# Patient Record
Sex: FEMALE | Race: WHITE | Employment: FULL TIME | URBAN - METROPOLITAN AREA
[De-identification: names, ages, dates, MRNs, and addresses within clinical notes are randomized per-mention and may not be internally consistent; named-entity substitution may affect disease eponyms.]

---

## 2017-05-08 PROBLEM — F43.10 NEUROSIS, POSTTRAUMATIC: Status: ACTIVE | Noted: 2017-02-21

## 2017-05-08 PROBLEM — F17.200 TOBACCO USE DISORDER: Status: ACTIVE | Noted: 2017-05-08

## 2017-12-24 ENCOUNTER — HOSPITAL ENCOUNTER (EMERGENCY)
Age: 27
Discharge: HOME | End: 2017-12-24
Attending: EMERGENCY MEDICINE
Payer: COMMERCIAL

## 2017-12-24 VITALS
SYSTOLIC BLOOD PRESSURE: 126 MMHG | OXYGEN SATURATION: 100 % | HEART RATE: 83 BPM | WEIGHT: 170 LBS | HEIGHT: 60 IN | DIASTOLIC BLOOD PRESSURE: 67 MMHG | RESPIRATION RATE: 16 BRPM | BODY MASS INDEX: 33.38 KG/M2 | TEMPERATURE: 98.3 F

## 2017-12-24 DIAGNOSIS — K08.89 PAIN, DENTAL: Primary | ICD-10-CM

## 2017-12-24 PROCEDURE — 99282 EMERGENCY DEPT VISIT SF MDM: CPT

## 2017-12-24 RX ORDER — TRAMADOL HYDROCHLORIDE 50 MG/1
50 TABLET ORAL EVERY 6 HOURS PRN
Qty: 10 TABLET | Refills: 0 | Status: SHIPPED | OUTPATIENT
Start: 2017-12-24 | End: 2020-01-06 | Stop reason: ALTCHOICE

## 2017-12-24 RX ORDER — PENICILLIN V POTASSIUM 500 MG/1
500 TABLET ORAL 4 TIMES DAILY
Qty: 40 TABLET | Refills: 0 | Status: SHIPPED | OUTPATIENT
Start: 2017-12-24 | End: 2018-01-03

## 2017-12-24 ASSESSMENT — PAIN DESCRIPTION - PAIN TYPE: TYPE: ACUTE PAIN

## 2017-12-24 ASSESSMENT — PAIN DESCRIPTION - LOCATION: LOCATION: TEETH

## 2017-12-24 ASSESSMENT — PAIN SCALES - GENERAL: PAINLEVEL_OUTOF10: 8

## 2017-12-24 NOTE — ED PROVIDER NOTES
[address]  eMERGENCY dEPARTMENT eNCOUnter      Pt Name: Merari Broussard  MRN: [id number]  [place] 1990  Date of evaluation: 12/24/17      CHIEF COMPLAINT:   Chief Complaint   Patient presents with    Dental Pain     HISTORY OF PRESENT ILLNESS    Merari Broussard is a 33 y.o. female who presents with complaints of dental pain that started several days ago. Pt admits to having a broken tooth. Denies n/v/f/c/abd pain/CP/SOB. No trouble breathing or swallowing. Pt has been taking tylenol and motrin with no relief. No other complaints. REVIEW OF SYSTEMS     Constitutional: Denies recent fever, chills. Eyes: No visual changes. Neck: No neck pain. Respiratory: Denies recent shortness of breath. Cardiac:  Denies recent chest pain. GI: denies any recent abdominal pain nausea or vomiting. Denies Blood in the stool or black tarry stools. : denies dysuria. Musculoskeletal: Denies focal weakness. Neurologic: denies headache or focal weakness. Skin:  Denies any rash. Negative in 10 essential Systems except as mentioned above and in the HPI. PAST MEDICAL HISTORY   PMH:  has a past medical history of Bipolar disorder ([facility] [address] and Depression. none otherwise stated in nurses notes  Surgical History:  has no past surgical history on file. none otherwise stated in nurses notes  Social History:  reports that she has been smoking. She does not have any smokeless tobacco history on file. She reports that she does not drink alcohol or use drugs. none otherwise stated in nurses notes  Family History: none otherwise stated in nurses notes  Psychiatric History: none otherwise stated in nurses notes    Allergies:has No Known Allergies.       PHYSICAL EXAM     INITIAL VITALS: /67   Pulse 83   Temp 98.3 °F (36.8 °C) (Oral)   Resp 16   Ht 5' (1.524 m)   Wt 170 lb (77.1 kg)   LMP 11/20/2017   SpO2 100%   BMI 33.20 kg/m²   CONSTITUTIONAL: Vital signs reviewed, Alert and oriented X 3. HEAD: Atraumatic, Normocephalic. EYES: Eyes are normal to inspection, Pupils equal, round and reactive to light. NECK: Normal ROM, No jugular venous distention, No meningeal signs, Cervical spine nontender. MOUTH:  + dental pain at 2, with no signs of abscess formation or Roger sign noted. No swelling involving the airway at all. No tongue elevation. No trismus. Lips are normal.    RESPIRATORY CHEST: No respiratory distress. ABDOMEN: Abdomen is nontender, No distension. UPPER EXTREMITY: Inspection normal, No cyanosis. NEURO: GCS is 15, Speech normal, Memory normal.   SKIN: Skin is warm, Skin is dry. PSYCHIATRIC: Oriented X 3, Normal affect. EMERGENCY DEPARTMENT COURSE:   Pain meds and antibiotic prescriptions. Pt provided with dental clinic list and instructed to call as soon as possible for an appointment. Instructed to return for worsening or any new symptoms including throat swelling, difficulty swallowing or breathing. Pt agrees. FINAL IMPRESSION:     1.  Pain, dental          DISPOSITION:  DISPOSITION Decision To Discharge 12/24/2017 02:51:15 PM        PATIENT REFERRED TO:  Lisa Pardo, [address] [place] [address] [phone]    Call in 1 day      [facility] ED  [facility]  [phone]    If symptoms worsen    dentist  see dental list          DISCHARGE MEDICATIONS:  Discharge Medication List as of 12/24/2017  2:52 PM      START taking these medications    Details   penicillin v potassium (VEETID) 500 MG tablet Take 1 tablet by mouth 4 times daily for 10 days, Disp-40 tablet, R-0Print      traMADol (ULTRAM) 50 MG tablet Take 1 tablet by mouth every 6 hours as needed for Pain ., Disp-10 tablet, R-0Print             (Please note that portions of this note were completed with a voice recognition program.  Efforts were made to edit the dictations but occasionally words are

## 2020-01-06 ENCOUNTER — HOSPITAL ENCOUNTER (EMERGENCY)
Age: 30
Discharge: HOME | End: 2020-01-06
Attending: EMERGENCY MEDICINE
Payer: MEDICARE

## 2020-01-06 VITALS
HEIGHT: 60 IN | OXYGEN SATURATION: 100 % | RESPIRATION RATE: 20 BRPM | WEIGHT: 140 LBS | DIASTOLIC BLOOD PRESSURE: 81 MMHG | SYSTOLIC BLOOD PRESSURE: 150 MMHG | TEMPERATURE: 98.1 F | BODY MASS INDEX: 27.48 KG/M2 | HEART RATE: 77 BPM

## 2020-01-06 LAB
-: ABNORMAL
ABSOLUTE EOS #: 0.51 K/UL (ref 0–0.44)
ABSOLUTE IMMATURE GRANULOCYTE: 0.05 K/UL (ref 0–0.3)
ABSOLUTE LYMPH #: 4.15 K/UL (ref 1.1–3.7)
ABSOLUTE MONO #: 0.9 K/UL (ref 0.1–1.2)
ALBUMIN SERPL-MCNC: 4 G/DL (ref 3.5–5.2)
ALBUMIN/GLOBULIN RATIO: 1.4 (ref 1–2.5)
ALP BLD-CCNC: 80 U/L (ref 35–104)
ALT SERPL-CCNC: 10 U/L (ref 5–33)
AMORPHOUS: ABNORMAL
ANION GAP SERPL CALCULATED.3IONS-SCNC: 12 MMOL/L (ref 9–17)
AST SERPL-CCNC: 12 U/L
BACTERIA: ABNORMAL
BASOPHILS # BLD: 1 % (ref 0–2)
BASOPHILS ABSOLUTE: 0.07 K/UL (ref 0–0.2)
BILIRUB SERPL-MCNC: <0.1 MG/DL (ref 0.3–1.2)
BILIRUBIN URINE: NEGATIVE
BUN BLDV-MCNC: 9 MG/DL (ref 6–20)
BUN/CREAT BLD: ABNORMAL (ref 9–20)
CALCIUM SERPL-MCNC: 9 MG/DL (ref 8.6–10.4)
CASTS UA: ABNORMAL /LPF (ref 0–8)
CHLORIDE BLD-SCNC: 104 MMOL/L (ref 98–107)
CO2: 20 MMOL/L (ref 20–31)
COLOR: YELLOW
COMMENT UA: ABNORMAL
CREAT SERPL-MCNC: 0.61 MG/DL (ref 0.5–0.9)
CRYSTALS, UA: ABNORMAL /HPF
DIFFERENTIAL TYPE: ABNORMAL
EOSINOPHILS RELATIVE PERCENT: 4 % (ref 1–4)
EPITHELIAL CELLS UA: ABNORMAL /HPF (ref 0–5)
GFR AFRICAN AMERICAN: >60 ML/MIN
GFR NON-AFRICAN AMERICAN: >60 ML/MIN
GFR SERPL CREATININE-BSD FRML MDRD: ABNORMAL ML/MIN/{1.73_M2}
GFR SERPL CREATININE-BSD FRML MDRD: ABNORMAL ML/MIN/{1.73_M2}
GLUCOSE BLD-MCNC: 100 MG/DL (ref 70–99)
GLUCOSE URINE: NEGATIVE
HCG QUALITATIVE: NEGATIVE
HCT VFR BLD CALC: 43.4 % (ref 36.3–47.1)
HEMOGLOBIN: 14.2 G/DL (ref 11.9–15.1)
IMMATURE GRANULOCYTES: 0 %
KETONES, URINE: ABNORMAL
LEUKOCYTE ESTERASE, URINE: ABNORMAL
LYMPHOCYTES # BLD: 30 % (ref 24–43)
MCH RBC QN AUTO: 29.3 PG (ref 25.2–33.5)
MCHC RBC AUTO-ENTMCNC: 32.7 G/DL (ref 28.4–34.8)
MCV RBC AUTO: 89.7 FL (ref 82.6–102.9)
MONOCYTES # BLD: 7 % (ref 3–12)
MUCUS: ABNORMAL
NITRITE, URINE: NEGATIVE
NRBC AUTOMATED: 0 PER 100 WBC
OTHER OBSERVATIONS UA: ABNORMAL
PDW BLD-RTO: 13.1 % (ref 11.8–14.4)
PH UA: 5 (ref 5–8)
PLATELET # BLD: 403 K/UL (ref 138–453)
PLATELET ESTIMATE: ABNORMAL
PMV BLD AUTO: 10.2 FL (ref 8.1–13.5)
POTASSIUM SERPL-SCNC: 3.6 MMOL/L (ref 3.7–5.3)
PROTEIN UA: NEGATIVE
RBC # BLD: 4.84 M/UL (ref 3.95–5.11)
RBC # BLD: ABNORMAL 10*6/UL
RBC UA: ABNORMAL /HPF (ref 0–4)
RENAL EPITHELIAL, UA: ABNORMAL /HPF
SEG NEUTROPHILS: 58 % (ref 36–65)
SEGMENTED NEUTROPHILS ABSOLUTE COUNT: 8.15 K/UL (ref 1.5–8.1)
SODIUM BLD-SCNC: 136 MMOL/L (ref 135–144)
SPECIFIC GRAVITY UA: 1.02 (ref 1–1.03)
TOTAL PROTEIN: 6.8 G/DL (ref 6.4–8.3)
TRICHOMONAS: ABNORMAL
TURBIDITY: ABNORMAL
URINE HGB: ABNORMAL
UROBILINOGEN, URINE: NORMAL
WBC # BLD: 13.8 K/UL (ref 3.5–11.3)
WBC # BLD: ABNORMAL 10*3/UL
WBC UA: ABNORMAL /HPF (ref 0–5)
YEAST: ABNORMAL

## 2020-01-06 PROCEDURE — 80053 COMPREHEN METABOLIC PANEL: CPT

## 2020-01-06 PROCEDURE — 96375 TX/PRO/DX INJ NEW DRUG ADDON: CPT

## 2020-01-06 PROCEDURE — 81001 URINALYSIS AUTO W/SCOPE: CPT

## 2020-01-06 PROCEDURE — 84703 CHORIONIC GONADOTROPIN ASSAY: CPT

## 2020-01-06 PROCEDURE — 2580000003 HC RX 258: Performed by: NURSE PRACTITIONER

## 2020-01-06 PROCEDURE — 87086 URINE CULTURE/COLONY COUNT: CPT

## 2020-01-06 PROCEDURE — 99284 EMERGENCY DEPT VISIT MOD MDM: CPT

## 2020-01-06 PROCEDURE — 85025 COMPLETE CBC W/AUTO DIFF WBC: CPT

## 2020-01-06 PROCEDURE — 6360000002 HC RX W HCPCS: Performed by: NURSE PRACTITIONER

## 2020-01-06 PROCEDURE — 96374 THER/PROPH/DIAG INJ IV PUSH: CPT

## 2020-01-06 RX ORDER — 0.9 % SODIUM CHLORIDE 0.9 %
1000 INTRAVENOUS SOLUTION INTRAVENOUS ONCE
Status: COMPLETED | OUTPATIENT
Start: 2020-01-06 | End: 2020-01-06

## 2020-01-06 RX ORDER — OXYCODONE HYDROCHLORIDE AND ACETAMINOPHEN 5; 325 MG/1; MG/1
1 TABLET ORAL EVERY 6 HOURS PRN
Qty: 5 TABLET | Refills: 0 | Status: SHIPPED | OUTPATIENT
Start: 2020-01-06 | End: 2020-01-09

## 2020-01-06 RX ORDER — IBUPROFEN 800 MG/1
800 TABLET ORAL EVERY 8 HOURS PRN
Qty: 30 TABLET | Refills: 0 | Status: SHIPPED | OUTPATIENT
Start: 2020-01-06 | End: 2022-03-18

## 2020-01-06 RX ORDER — FENTANYL CITRATE 50 UG/ML
50 INJECTION, SOLUTION INTRAMUSCULAR; INTRAVENOUS ONCE
Status: COMPLETED | OUTPATIENT
Start: 2020-01-06 | End: 2020-01-06

## 2020-01-06 RX ORDER — KETOROLAC TROMETHAMINE 15 MG/ML
15 INJECTION, SOLUTION INTRAMUSCULAR; INTRAVENOUS ONCE
Status: COMPLETED | OUTPATIENT
Start: 2020-01-06 | End: 2020-01-06

## 2020-01-06 RX ORDER — ONDANSETRON 2 MG/ML
4 INJECTION INTRAMUSCULAR; INTRAVENOUS ONCE
Status: COMPLETED | OUTPATIENT
Start: 2020-01-06 | End: 2020-01-06

## 2020-01-06 RX ORDER — TAMSULOSIN HYDROCHLORIDE 0.4 MG/1
0.4 CAPSULE ORAL DAILY
Qty: 20 CAPSULE | Refills: 0 | Status: SHIPPED | OUTPATIENT
Start: 2020-01-06 | End: 2020-01-26

## 2020-01-06 RX ADMIN — SODIUM CHLORIDE 1000 ML: 9 INJECTION, SOLUTION INTRAVENOUS at 17:54

## 2020-01-06 RX ADMIN — ONDANSETRON 4 MG: 2 INJECTION INTRAMUSCULAR; INTRAVENOUS at 17:50

## 2020-01-06 RX ADMIN — KETOROLAC TROMETHAMINE 15 MG: 15 INJECTION, SOLUTION INTRAMUSCULAR; INTRAVENOUS at 17:49

## 2020-01-06 RX ADMIN — FENTANYL CITRATE 50 MCG: 50 INJECTION, SOLUTION INTRAMUSCULAR; INTRAVENOUS at 17:49

## 2020-01-06 ASSESSMENT — ENCOUNTER SYMPTOMS
SHORTNESS OF BREATH: 0
CHEST TIGHTNESS: 0
VOMITING: 1
SORE THROAT: 0
TROUBLE SWALLOWING: 0
WHEEZING: 0
ABDOMINAL PAIN: 1
COUGH: 0
CONSTIPATION: 0
BACK PAIN: 1
DIARRHEA: 0
NAUSEA: 1
FACIAL SWELLING: 0
STRIDOR: 0
BLOOD IN STOOL: 0
EYE PAIN: 0
RHINORRHEA: 0
PHOTOPHOBIA: 0
VOICE CHANGE: 0

## 2020-01-06 ASSESSMENT — PAIN SCALES - GENERAL
PAINLEVEL_OUTOF10: 10
PAINLEVEL_OUTOF10: 10

## 2020-01-06 ASSESSMENT — PAIN DESCRIPTION - DESCRIPTORS: DESCRIPTORS: SHARP

## 2020-01-06 ASSESSMENT — PAIN DESCRIPTION - PAIN TYPE: TYPE: ACUTE PAIN

## 2020-01-06 ASSESSMENT — PAIN DESCRIPTION - PROGRESSION: CLINICAL_PROGRESSION: GRADUALLY WORSENING

## 2020-01-06 ASSESSMENT — PAIN DESCRIPTION - FREQUENCY: FREQUENCY: CONTINUOUS

## 2020-01-06 ASSESSMENT — PAIN DESCRIPTION - ONSET: ONSET: SUDDEN

## 2020-01-06 ASSESSMENT — PAIN DESCRIPTION - ORIENTATION: ORIENTATION: LEFT

## 2020-01-06 ASSESSMENT — PAIN DESCRIPTION - LOCATION: LOCATION: FLANK

## 2020-01-06 NOTE — ED PROVIDER NOTES
[facility]     Emergency Department     Faculty Attestation    I performed a history and physical examination of the patient and discussed management with the resident. I reviewed the residents note and agree with the documented findings including all diagnostic interpretations and plan of care. Any areas of disagreement are noted on the chart. I was personally present for the key portions of any procedures. I have documented in the chart those procedures where I was not present during the key portions. I have reviewed the emergency nurses triage note. I agree with the chief complaint, past medical history, past surgical history, allergies, medications, social and family history as documented unless otherwise noted below. Documentation of the HPI, Physical Exam and Medical Decision Making performed by scribvaleria is based on my personal performance of the HPI, PE and MDM. For Physician Assistant/ Nurse Practitioner cases/documentation I have personally evaluated this patient and have completed at least one if not all key elements of the E/M (history, physical exam, and MDM). Additional findings are as noted. Primary Care Physician: Karrie Wilhelm MD    History: This is a 30 y.o. female who presents to the Emergency Department with complaint of flank pain. Left-sided. History of kidney stones although typically on the right. No hematuria but has had some dysuria and decreased urine output. No fevers. Physical:     height is 5' (1.524 m) and weight is 140 lb (63.5 kg). Her oral temperature is 98.1 °F (36.7 °C). Her blood pressure is 150/81 (abnormal) and her pulse is 77. Her respiration is 20 and oxygen saturation is 100%.     30 y.o. female appears uncomfortable, difficult time finding comfortable position on the bed cardiac exam regular rate and rhythm no murmurs rubs gallops, pulmonary clear bilaterally abdomen is soft nontender nondistended but

## 2020-01-06 NOTE — ED PROVIDER NOTES
[facility] ED  Emergency Department Encounter  Advanced Practice Provider     Pt Name: Jeevan Hernandez  MRN: [id number]  [place] 1990  Date of evaluation: 1/6/20  PCP:  Shawn Woodard MD    [address] [place] [address]       Chief Complaint   Patient presents with    Flank Pain     left flank pain with nausea and dry heaving worsening since yesterday       HISTORY OF PRESENT ILLNESS  (Location/Symptom, Timing/Onset,Context/Setting, Quality, Duration, Modifying Factors, Severity.)      Jeevan Hernandez is a 30 y.o. female who presents with possible kidney stone. Patient states that the left-sided flank pain started yesterday however got severely worse today. She states that she has had kidney stones in the past however they are normally on the right side. She states usually she is put on antibiotics as well as Flomax and they resolve. She is never had to have stents or lithotripsy performed. She has not had anything for pain. She is unsure of any blood in her urine due to being on her menstrual cycle at this time. Patient states it is however difficult for her to urinate and painful. She states she has been urinating very often as well. She reports nausea and vomiting with this also. Patient appears diaphoretic and pale due to her pain. She cannot sit still in the bed. She denies fever or chills. No abdominal pain. PAST MEDICAL /SURGICAL / SOCIAL / FAMILY HISTORY      has a past medical history of Anxiety, Bipolar disorder ([address] Depression, and Kidney stones. She denies any surgical history at this time.     Social History     Socioeconomic History    Marital status: Single     Spouse name: Not on file    Number of children: Not on file    Years of education: Not on file    Highest education level: Not on file   Occupational History    Not on file   Social Needs    Financial resource strain: Not on file    Food insecurity:     Worry: Not on file     Inability: Not on file    Transportation needs:     Medical: Not on file     Non-medical: Not on file   Tobacco Use    Smoking status: Current Every Day Smoker     Packs/day: 1.00     Types: Cigarettes    Smokeless tobacco: Never Used   Substance and Sexual Activity    Alcohol use: No    Drug use: No    Sexual activity: Not on file   Lifestyle    Physical activity:     Days per week: Not on file     Minutes per session: Not on file    Stress: Not on file   Relationships    Social connections:     Talks on phone: Not on file     Gets together: Not on file     Attends Taoist service: Not on file     Active member of club or organization: Not on file     Attends meetings of clubs or organizations: Not on file     Relationship status: Not on file    Intimate partner violence:     Fear of current or ex partner: Not on file     Emotionally abused: Not on file     Physically abused: Not on file     Forced sexual activity: Not on file   Other Topics Concern    Not on file   Social History Narrative    Not on file       History reviewed. No pertinent family history. Allergies:  Patient has no known allergies. Home Medications:  Prior to Admission medications    Medication Sig Start Date End Date Taking? Authorizing Provider   tamsulosin (FLOMAX) 0.4 MG capsule Take 1 capsule by mouth daily for 20 days 1/6/20 1/26/20 Yes KASH Choi CNP   ibuprofen (IBU) 800 MG tablet Take 1 tablet by mouth every 8 hours as needed for Pain 1/6/20  Yes KASH Choi CNP   oxyCODONE-acetaminophen (PERCOCET) 5-325 MG per tablet Take 1 tablet by mouth every 6 hours as needed for Pain for up to 3 days. Intended supply: 3 days.  Take lowest dose possible to manage pain 1/6/20 1/9/20 Yes KASH Choi CNP   clomiPHENE (CLOMID) 50 MG tablet TAKE 1 TABLET BY MOUTH DAILY ON DAYS 3 THROUGH 7 OF MENSTRUAL CYCLE 4/21/17   Historical Provider, MD       patient's medication list has been reviewed as entered by the nursing staff. REVIEW OF SYSTEMS    (2-9 systems for level 4, 10 or more for level 5)      Review of Systems   Constitutional: Positive for appetite change and diaphoresis. Negative for chills, fatigue and fever. HENT: Negative for congestion, drooling, ear pain, facial swelling, rhinorrhea, sore throat, tinnitus, trouble swallowing and voice change. Eyes: Negative for photophobia, pain and visual disturbance. Respiratory: Negative for cough, chest tightness, shortness of breath, wheezing and stridor. Cardiovascular: Negative for chest pain, palpitations and leg swelling. Gastrointestinal: Positive for abdominal pain (left sided radiatng from flank), nausea and vomiting. Negative for blood in stool, constipation and diarrhea. Endocrine: Positive for polyuria. Genitourinary: Positive for difficulty urinating, dysuria, flank pain (left flank), frequency, hematuria and urgency. Negative for vaginal discharge and vaginal pain. Musculoskeletal: Positive for back pain. Negative for arthralgias, joint swelling, myalgias and neck pain. Skin: Positive for pallor. Negative for rash and wound. Neurological: Negative for dizziness, seizures, weakness, light-headedness, numbness and headaches. Psychiatric/Behavioral: The patient is nervous/anxious. PHYSICAL EXAM  (up to 7 for level 4, 8 or more for level 5)      INITIAL VITALS:  height is 5' (1.524 m) and weight is 140 lb (63.5 kg). Her oral temperature is 98.1 °F (36.7 °C). Her blood pressure is 150/81 (abnormal) and her pulse is 77. Her respiration is 20 and oxygen saturation is 100%. Physical Exam  Vitals signs and nursing note reviewed. Constitutional:       General: She is awake. She is in acute distress. Appearance: Normal appearance. She is well-developed and overweight. She is diaphoretic. She is not ill-appearing or toxic-appearing. HENT:      Head: Normocephalic and atraumatic.       Right Ear: Hearing and external ear ORDERED:  Orders Placed This Encounter   Medications    0.9 % sodium chloride bolus    ondansetron (ZOFRAN) injection 4 mg    fentaNYL (SUBLIMAZE) injection 50 mcg    ketorolac (TORADOL) injection 15 mg    tamsulosin (FLOMAX) 0.4 MG capsule     Sig: Take 1 capsule by mouth daily for 20 days     Dispense:  20 capsule     Refill:  0    ibuprofen (IBU) 800 MG tablet     Sig: Take 1 tablet by mouth every 8 hours as needed for Pain     Dispense:  30 tablet     Refill:  0    oxyCODONE-acetaminophen (PERCOCET) 5-325 MG per tablet     Sig: Take 1 tablet by mouth every 6 hours as needed for Pain for up to 3 days. Intended supply: 3 days. Take lowest dose possible to manage pain     Dispense:  5 tablet     Refill:  0       Controlled Substances Monitoring:       Differential Diagnoses:  Herniated disc, AAA rupture, pyelonephritis, kidney stone, musculoskeletal pain, retroperitoneal hematoma, pancreatitis, splenic rupture, pneumonia    DIAGNOSTIC RESULTS / EMERGENCY DEPARTMENT COURSE / MDM     Patient arrives today with likely kidney stone. She is having left-sided flank pain as well as burning with urination and difficulty urinating and frequency. She states she has had these in the past however they are normally on the right side. Patient appears to be in extreme pain and cannot sit still in the bed. She is pale and diaphoretic but nontoxic. Patient given pain relief and labs and urine were collected. Patient had immediate relief after this and looks much more comfortable. Patient able to sit comfortably in the bed and walk to the bathroom without issue. Lab results indicate likely kidney stone that is not infected at this time. She was given prescription for Flomax and 5 Percocets and told to take as needed. She was given the phone number for urology to follow-up with soon.   Please return to the ER with any worsening of symptoms including increasing abdominal pain increasing flank pain, fevers chills

## 2020-01-06 NOTE — ED TRIAGE NOTES
Patient presents complaining of pain beginning as dysuria this morning gradually worsening into left flank pain with associated nausea and dry heaving. No true vomiting. Patient has hx kidney stones in the past and states current symptoms are similar.

## 2020-01-06 NOTE — LETTER
[facility] ED [place] [address]  Phone: [phone]               January 6, 2020    Patient: Dannis Halsted   YOB: 1990   Date of Visit: 1/6/2020       To Whom It May Concern:    Roman Irving was seen and treated in our emergency department on 1/6/2020. She may return to work on 1/8/2020.       Sincerely,               Signature:__________________________________

## 2020-01-08 LAB
CULTURE: NORMAL
Lab: NORMAL
SPECIMEN DESCRIPTION: NORMAL

## 2020-02-04 ENCOUNTER — OFFICE VISIT (OUTPATIENT)
Dept: PRIMARY CARE CLINIC | Age: 30
End: 2020-02-04
Payer: MEDICARE

## 2020-02-04 VITALS
WEIGHT: 177 LBS | BODY MASS INDEX: 34.57 KG/M2 | DIASTOLIC BLOOD PRESSURE: 79 MMHG | TEMPERATURE: 97.3 F | HEART RATE: 77 BPM | OXYGEN SATURATION: 97 % | SYSTOLIC BLOOD PRESSURE: 123 MMHG

## 2020-02-04 PROCEDURE — G8484 FLU IMMUNIZE NO ADMIN: HCPCS | Performed by: INTERNAL MEDICINE

## 2020-02-04 PROCEDURE — G8427 DOCREV CUR MEDS BY ELIG CLIN: HCPCS | Performed by: INTERNAL MEDICINE

## 2020-02-04 PROCEDURE — 99202 OFFICE O/P NEW SF 15 MIN: CPT | Performed by: INTERNAL MEDICINE

## 2020-02-04 PROCEDURE — 4004F PT TOBACCO SCREEN RCVD TLK: CPT | Performed by: INTERNAL MEDICINE

## 2020-02-04 PROCEDURE — G8417 CALC BMI ABV UP PARAM F/U: HCPCS | Performed by: INTERNAL MEDICINE

## 2020-02-04 RX ORDER — AMOXICILLIN 500 MG/1
500 CAPSULE ORAL 3 TIMES DAILY
Qty: 21 CAPSULE | Refills: 0 | Status: SHIPPED | OUTPATIENT
Start: 2020-02-04 | End: 2020-02-11

## 2020-02-04 RX ORDER — BUTALBITAL, ACETAMINOPHEN AND CAFFEINE 50; 325; 40 MG/1; MG/1; MG/1
1 TABLET ORAL EVERY 4 HOURS PRN
Qty: 20 TABLET | Refills: 0 | Status: SHIPPED | OUTPATIENT
Start: 2020-02-04 | End: 2022-03-18

## 2020-02-04 ASSESSMENT — ENCOUNTER SYMPTOMS: PHOTOPHOBIA: 1

## 2020-02-04 NOTE — PROGRESS NOTES
[address] [facility]  [address] [place] [address]  Dept: [phone]  Dept Fax: [phone]    Alyse Wilson is a 30 y.o. female who presents to the urgent care today for her medicalconditions/complaints as noted below. Alyse Wilson is c/o of Headache      HPI:     Headache    This is a recurrent problem. The current episode started yesterday. The problem occurs constantly. The pain is located in the bilateral region. The pain does not radiate. The quality of the pain is described as squeezing and sharp. The pain is moderate. Associated symptoms include photophobia. Associated symptoms comments: Dental pain. . She has tried NSAIDs and acetaminophen for the symptoms. The treatment provided no relief. Past Medical History:   Diagnosis Date    Anxiety     Bipolar disorder ([facility] [address]     Depression     Kidney stones         Current Outpatient Medications   Medication Sig Dispense Refill    butalbital-acetaminophen-caffeine (FIORICET, ESGIC) -40 MG per tablet Take 1 tablet by mouth every 4 hours as needed for Headaches 20 tablet 0    amoxicillin (AMOXIL) 500 MG capsule Take 1 capsule by mouth 3 times daily for 7 days 21 capsule 0    ibuprofen (IBU) 800 MG tablet Take 1 tablet by mouth every 8 hours as needed for Pain 30 tablet 0    tamsulosin (FLOMAX) 0.4 MG capsule Take 1 capsule by mouth daily for 20 days 20 capsule 0    clomiPHENE (CLOMID) 50 MG tablet TAKE 1 TABLET BY MOUTH DAILY ON DAYS 3 THROUGH 7 OF MENSTRUAL CYCLE  3     No current facility-administered medications for this visit.       No Known Allergies    Health Maintenance   Topic Date Due    Varicella vaccine (1 of 2 - 2-dose childhood series) 11/22/1992    Pneumococcal 0-64 years Vaccine (1 of 1 - PPSV23) 11/22/1997    DTaP/Tdap/Td vaccine (1 - Tdap) 11/22/2002    Flu vaccine (1) 09/01/2019    Cervical cancer screen  02/01/2020    Shingles Vaccine (1 of 2)

## 2020-02-04 NOTE — PROGRESS NOTES
Visit Information    Have you changed or started any medications since your last visit including any over-the-counter medicines, vitamins, or herbal medicines? no   Are you having any side effects from any of your medications? -  no  Have you stopped taking any of your medications? Is so, why? -  no    Have you seen any other physician or provider since your last visit? No  Have you had any other diagnostic tests since your last visit? No  Have you been seen in the emergency room and/or had an admission to a hospital since we last saw you? No  Have you had your routine dental cleaning in the past 6 months? no    Have you activated your [employer] account? If not, what are your barriers?  Yes     Patient Care Team:  Lanie Osborn MD as PCP - General (Family Medicine)  Lanie Osborn MD as PCP - [facility]    Medical History Review  Past Medical, Family, and Social History reviewed and does not contribute to the patient presenting condition    Health Maintenance   Topic Date Due    Varicella Vaccine (1 of 2 - 2-dose childhood series) 11/22/1992    Pneumococcal 0-64 years Vaccine (1 of 1 - PPSV23) 11/22/1997    DTaP/Tdap/Td vaccine (1 - Tdap) 11/22/2002    Flu vaccine (1) 09/01/2019    Cervical cancer screen  02/01/2020    HIV screen  Discontinued

## 2020-06-18 ENCOUNTER — HOSPITAL ENCOUNTER (EMERGENCY)
Age: 30
Discharge: HOME | End: 2020-06-18
Attending: EMERGENCY MEDICINE
Payer: MEDICARE

## 2020-06-18 VITALS
DIASTOLIC BLOOD PRESSURE: 85 MMHG | TEMPERATURE: 98.2 F | SYSTOLIC BLOOD PRESSURE: 139 MMHG | HEART RATE: 82 BPM | OXYGEN SATURATION: 98 % | RESPIRATION RATE: 18 BRPM

## 2020-06-18 PROCEDURE — 96372 THER/PROPH/DIAG INJ SC/IM: CPT

## 2020-06-18 PROCEDURE — 99282 EMERGENCY DEPT VISIT SF MDM: CPT

## 2020-06-18 PROCEDURE — 6370000000 HC RX 637 (ALT 250 FOR IP): Performed by: STUDENT IN AN ORGANIZED HEALTH CARE EDUCATION/TRAINING PROGRAM

## 2020-06-18 PROCEDURE — 6360000002 HC RX W HCPCS: Performed by: STUDENT IN AN ORGANIZED HEALTH CARE EDUCATION/TRAINING PROGRAM

## 2020-06-18 RX ORDER — KETOROLAC TROMETHAMINE 30 MG/ML
30 INJECTION, SOLUTION INTRAMUSCULAR; INTRAVENOUS ONCE
Status: COMPLETED | OUTPATIENT
Start: 2020-06-18 | End: 2020-06-18

## 2020-06-18 RX ORDER — PENICILLIN V POTASSIUM 500 MG/1
500 TABLET ORAL 4 TIMES DAILY
Qty: 28 TABLET | Refills: 0 | Status: SHIPPED | OUTPATIENT
Start: 2020-06-18 | End: 2020-06-25

## 2020-06-18 RX ORDER — PENICILLIN V POTASSIUM 250 MG/1
500 TABLET ORAL ONCE
Status: COMPLETED | OUTPATIENT
Start: 2020-06-18 | End: 2020-06-18

## 2020-06-18 RX ORDER — ACETAMINOPHEN 325 MG/1
650 TABLET ORAL EVERY 6 HOURS PRN
Qty: 120 TABLET | Refills: 0 | Status: SHIPPED | OUTPATIENT
Start: 2020-06-18 | End: 2022-03-18

## 2020-06-18 RX ORDER — IBUPROFEN 800 MG/1
800 TABLET ORAL 2 TIMES DAILY PRN
Qty: 60 TABLET | Refills: 0 | Status: SHIPPED | OUTPATIENT
Start: 2020-06-18 | End: 2022-03-18

## 2020-06-18 RX ADMIN — KETOROLAC TROMETHAMINE 30 MG: 30 INJECTION, SOLUTION INTRAMUSCULAR at 09:11

## 2020-06-18 RX ADMIN — PENICILLIN V POTASSIUM 500 MG: 250 TABLET ORAL at 09:11

## 2020-06-18 ASSESSMENT — ENCOUNTER SYMPTOMS
VOMITING: 0
SHORTNESS OF BREATH: 0
NAUSEA: 0
FACIAL SWELLING: 1
COUGH: 0

## 2020-06-18 ASSESSMENT — PAIN DESCRIPTION - LOCATION: LOCATION: JAW

## 2020-06-18 ASSESSMENT — PAIN SCALES - GENERAL
PAINLEVEL_OUTOF10: 10
PAINLEVEL_OUTOF10: 5

## 2020-06-18 ASSESSMENT — PAIN DESCRIPTION - DESCRIPTORS
DESCRIPTORS: SORE
DESCRIPTORS: TENDER;THROBBING

## 2020-06-18 ASSESSMENT — PAIN DESCRIPTION - PAIN TYPE: TYPE: ACUTE PAIN

## 2020-06-18 ASSESSMENT — PAIN DESCRIPTION - FREQUENCY: FREQUENCY: INTERMITTENT

## 2020-06-18 NOTE — ED PROVIDER NOTES
[address] [facility]     Emergency Department     Faculty Attestation        I performed a history and physical examination of the patient and discussed management with the resident. I reviewed the residents note and agree with the documented findings and plan of care. Any areas of disagreement are noted on the chart. I was personally present for the key portions of any procedures. I have documented in the chart those procedures where I was not present during the key portions. I have reviewed the emergency nurses triage note. I agree with the chief complaint, past medical history, past surgical history, allergies, medications, social and family history as documented unless otherwise noted below. For mid-level providers such as nurse practitioners as well as physicians assistants:    I have personally seen and evaluated the patient. I find the patient's history and physical exam are consistent with NP/PA documentation. I agree with the care provided, treatment rendered, disposition, & follow-up plan. Additional findings are as noted.     Vital Signs: /85   Pulse 82   Temp 98.2 °F (36.8 °C) (Oral)   Resp 18   SpO2 98%   PCP:  Carolina Torres MD    Pertinent Comments:           Critical Care  None          Irene Yañez MD  Attending Emergency Medicine Physician              Katy Spencer MD  06/18/20 [id number]

## 2020-06-18 NOTE — ED PROVIDER NOTES
Not on file     Active member of club or organization: Not on file     Attends meetings of clubs or organizations: Not on file     Relationship status: Not on file    Intimate partner violence     Fear of current or ex partner: Not on file     Emotionally abused: Not on file     Physically abused: Not on file     Forced sexual activity: Not on file   Other Topics Concern    Not on file   Social History Narrative    Not on file       History reviewed. No pertinent family history. Allergies:  Patient has no known allergies. Home Medications:  Prior to Admission medications    Medication Sig Start Date End Date Taking? Authorizing Provider   ibuprofen (ADVIL;MOTRIN) 800 MG tablet Take 1 tablet by mouth 2 times daily as needed for Pain 6/18/20  Yes Elsa Obrien DO   acetaminophen (TYLENOL) 325 MG tablet Take 2 tablets by mouth every 6 hours as needed for Pain 6/18/20  Yes Elsa Obrien DO   penicillin v potassium (VEETID) 500 MG tablet Take 1 tablet by mouth 4 times daily for 7 days 6/18/20 6/25/20 Yes Elsa Obrien DO   butalbital-acetaminophen-caffeine (FIORICET, ESGIC) -82 MG per tablet Take 1 tablet by mouth every 4 hours as needed for Headaches 2/4/20   Abby Cali MD   [facility]) 0.4 MG capsule Take 1 capsule by mouth daily for 20 days 1/6/20 1/26/20  KASH Jacuqes CNP   ibuprofen (IBU) 800 MG tablet Take 1 tablet by mouth every 8 hours as needed for Pain 1/6/20   KASH Jacques - CNP   clomiPHENE (CLOMID) 50 MG tablet TAKE 1 TABLET BY MOUTH DAILY ON DAYS 3 THROUGH 7 OF MENSTRUAL CYCLE 4/21/17   Historical Provider, MD       REVIEW OF SYSTEMS    (2-9 systems for level 4, 10 or more for level 5)      Review of Systems   Constitutional: Negative for fever. HENT: Positive for dental problem and facial swelling. Eyes: Negative for visual disturbance. Respiratory: Negative for cough and shortness of breath.     Gastrointestinal: Negative for nausea and obvious distress presenting with dental pain. Pain to palpation of the gingiva of top left molar. Plan for analgesics, antibiotics and dental clinic follow-up      EMERGENCY DEPARTMENT COURSE:  Evaluated bedside, no acute distress, vital signs stable and afebrile. No evidence of fluctuant abscess on exam.  Patient with multiple dental caries. Patient given IM Toradol, p.o. penicillin VK. Plan for discharge with dental clinic follow-up. Patient given strict ED return cautions follow directions. Verbalized understanding of and agreement with discharge plan    PROCEDURES:  None    CONSULTS:  None    CRITICAL CARE:  Please see attending note    FINAL IMPRESSION      1. Pain, dental          DISPOSITION / PLAN     DISPOSITION        PATIENT REFERRED TO:  Maverick Bob, [address]  [address] [place] [address]  [phone]    Schedule an appointment as soon as possible for a visit in 1 week  For re-evaluation, if unable to see dentist      DISCHARGE MEDICATIONS:  Discharge Medication List as of 6/18/2020  9:27 AM      START taking these medications    Details   !! ibuprofen (ADVIL;MOTRIN) 800 MG tablet Take 1 tablet by mouth 2 times daily as needed for Pain, Disp-60 tablet, R-0Print      acetaminophen (TYLENOL) 325 MG tablet Take 2 tablets by mouth every 6 hours as needed for Pain, Disp-120 tablet, R-0Print      penicillin v potassium (VEETID) 500 MG tablet Take 1 tablet by mouth 4 times daily for 7 days, Disp-28 tablet, R-0Print       !! - Potential duplicate medications found. Please discuss with provider.           Quintin Day DO  Emergency Medicine Resident    (Please note that portions of thisnote were completed with a voice recognition program.  Efforts were made to edit the dictations but occasionally words are mis-transcribed.)        Quintin Day DO  Resident  06/18/20 Debora Lamar

## 2020-07-21 ENCOUNTER — HOSPITAL ENCOUNTER (EMERGENCY)
Age: 30
Discharge: HOME | End: 2020-07-21
Attending: EMERGENCY MEDICINE
Payer: MEDICARE

## 2020-07-21 ENCOUNTER — APPOINTMENT (OUTPATIENT)
Dept: GENERAL RADIOLOGY | Age: 30
End: 2020-07-21
Payer: MEDICARE

## 2020-07-21 VITALS
SYSTOLIC BLOOD PRESSURE: 142 MMHG | OXYGEN SATURATION: 97 % | BODY MASS INDEX: 25.61 KG/M2 | TEMPERATURE: 97.9 F | HEART RATE: 64 BPM | RESPIRATION RATE: 15 BRPM | HEIGHT: 64 IN | DIASTOLIC BLOOD PRESSURE: 79 MMHG | WEIGHT: 150 LBS

## 2020-07-21 LAB
ABSOLUTE EOS #: 0.34 K/UL (ref 0–0.44)
ABSOLUTE IMMATURE GRANULOCYTE: 0.04 K/UL (ref 0–0.3)
ABSOLUTE LYMPH #: 2.38 K/UL (ref 1.1–3.7)
ABSOLUTE MONO #: 0.82 K/UL (ref 0.1–1.2)
ANION GAP SERPL CALCULATED.3IONS-SCNC: 11 MMOL/L (ref 9–17)
BASOPHILS # BLD: 1 % (ref 0–2)
BASOPHILS ABSOLUTE: 0.06 K/UL (ref 0–0.2)
BUN BLDV-MCNC: 9 MG/DL (ref 6–20)
BUN/CREAT BLD: NORMAL (ref 9–20)
CALCIUM SERPL-MCNC: 9.2 MG/DL (ref 8.6–10.4)
CHLORIDE BLD-SCNC: 102 MMOL/L (ref 98–107)
CO2: 24 MMOL/L (ref 20–31)
CREAT SERPL-MCNC: 0.59 MG/DL (ref 0.5–0.9)
DIFFERENTIAL TYPE: ABNORMAL
EOSINOPHILS RELATIVE PERCENT: 4 % (ref 1–4)
GFR AFRICAN AMERICAN: >60 ML/MIN
GFR NON-AFRICAN AMERICAN: >60 ML/MIN
GFR SERPL CREATININE-BSD FRML MDRD: NORMAL ML/MIN/{1.73_M2}
GFR SERPL CREATININE-BSD FRML MDRD: NORMAL ML/MIN/{1.73_M2}
GLUCOSE BLD-MCNC: 94 MG/DL (ref 70–99)
HCT VFR BLD CALC: 46.7 % (ref 36.3–47.1)
HEMOGLOBIN: 15.9 G/DL (ref 11.9–15.1)
IMMATURE GRANULOCYTES: 1 %
LYMPHOCYTES # BLD: 29 % (ref 24–43)
MCH RBC QN AUTO: 29.9 PG (ref 25.2–33.5)
MCHC RBC AUTO-ENTMCNC: 34 G/DL (ref 28.4–34.8)
MCV RBC AUTO: 87.8 FL (ref 82.6–102.9)
MONOCYTES # BLD: 10 % (ref 3–12)
NRBC AUTOMATED: 0 PER 100 WBC
PDW BLD-RTO: 13 % (ref 11.8–14.4)
PLATELET # BLD: 379 K/UL (ref 138–453)
PLATELET ESTIMATE: ABNORMAL
PMV BLD AUTO: 9.9 FL (ref 8.1–13.5)
POTASSIUM SERPL-SCNC: 4.3 MMOL/L (ref 3.7–5.3)
RBC # BLD: 5.32 M/UL (ref 3.95–5.11)
RBC # BLD: ABNORMAL 10*6/UL
SEG NEUTROPHILS: 55 % (ref 36–65)
SEGMENTED NEUTROPHILS ABSOLUTE COUNT: 4.68 K/UL (ref 1.5–8.1)
SODIUM BLD-SCNC: 137 MMOL/L (ref 135–144)
WBC # BLD: 8.3 K/UL (ref 3.5–11.3)
WBC # BLD: ABNORMAL 10*3/UL

## 2020-07-21 PROCEDURE — 85025 COMPLETE CBC W/AUTO DIFF WBC: CPT

## 2020-07-21 PROCEDURE — 71045 X-RAY EXAM CHEST 1 VIEW: CPT

## 2020-07-21 PROCEDURE — 6370000000 HC RX 637 (ALT 250 FOR IP): Performed by: STUDENT IN AN ORGANIZED HEALTH CARE EDUCATION/TRAINING PROGRAM

## 2020-07-21 PROCEDURE — 93005 ELECTROCARDIOGRAM TRACING: CPT | Performed by: STUDENT IN AN ORGANIZED HEALTH CARE EDUCATION/TRAINING PROGRAM

## 2020-07-21 PROCEDURE — 80048 BASIC METABOLIC PNL TOTAL CA: CPT

## 2020-07-21 PROCEDURE — 99284 EMERGENCY DEPT VISIT MOD MDM: CPT

## 2020-07-21 RX ORDER — BENZONATATE 100 MG/1
100 CAPSULE ORAL ONCE
Status: COMPLETED | OUTPATIENT
Start: 2020-07-21 | End: 2020-07-21

## 2020-07-21 RX ADMIN — BENZONATATE 100 MG: 100 CAPSULE ORAL at 12:15

## 2020-07-21 ASSESSMENT — PAIN DESCRIPTION - PAIN TYPE: TYPE: ACUTE PAIN

## 2020-07-21 ASSESSMENT — ENCOUNTER SYMPTOMS
COUGH: 1
SORE THROAT: 0
VOMITING: 0
NAUSEA: 1
SHORTNESS OF BREATH: 1
PHOTOPHOBIA: 0
ABDOMINAL PAIN: 0

## 2020-07-21 ASSESSMENT — PAIN DESCRIPTION - DESCRIPTORS: DESCRIPTORS: CONSTANT

## 2020-07-21 ASSESSMENT — PAIN DESCRIPTION - FREQUENCY: FREQUENCY: CONTINUOUS

## 2020-07-21 ASSESSMENT — PAIN DESCRIPTION - LOCATION: LOCATION: HEAD

## 2020-07-21 NOTE — ED PROVIDER NOTES
[facility] ED  Emergency Department Encounter  EmergencyMedicine Resident     Pt Name:Glia Owen  MRN: [id number]  [place] 1990  Date of evaluation: 7/21/20  PCP:  No primary care provider on file. CHIEF COMPLAINT       Chief Complaint   Patient presents with    Headache     \"on and off\"    Cough     x several days with stuffy nose, \"I cough so hard mt chest hurts\"    Nausea     emesis, none since yesterday       HISTORY OF PRESENT ILLNESS  (Location/Symptom, Timing/Onset, Context/Setting, Quality, Duration, Modifying Factors, Severity.)      Cally Delacruz is a 30 y.o. female who presents with Cough and shortness of breath. Patient reports that for the past 5 days patient is been having dry cough, shortness of breath, and chest tightness. Patient also notes that she is having multiple episodes of liquid stools along with nausea and vomiting and has a hard time keeping things down. Patient presents today as she is confirmed that she is infectious and her 1year-old son is now starting to develop a cough. Patient denies any recent cold exposure. Patient notes that she has had no fevers or chills, sore throat, abdominal pain, or dysuria. Patient denies any recent travels, surgeries, trauma, history of DVTs, cancer, or estrogen use. PAST MEDICAL / SURGICAL / SOCIAL / FAMILY HISTORY      has a past medical history of Anxiety, Bipolar disorder ([address] Depression, and Kidney stones. has no past surgical history on file.     Social History     Socioeconomic History    Marital status: Single     Spouse name: Not on file    Number of children: Not on file    Years of education: Not on file    Highest education level: Not on file   Occupational History    Not on file   Social Needs    Financial resource strain: Not on file    Food insecurity     Worry: Not on file     Inability: Not on file    Transportation needs     Medical: Not on file     Non-medical: Not on file   Tobacco Use    Smoking status: Current Every Day Smoker     Packs/day: 1.00     Types: Cigarettes    Smokeless tobacco: Never Used   Substance and Sexual Activity    Alcohol use: No    Drug use: No    Sexual activity: Not on file   Lifestyle    Physical activity     Days per week: Not on file     Minutes per session: Not on file    Stress: Not on file   Relationships    Social connections     Talks on phone: Not on file     Gets together: Not on file     Attends [employer] service: Not on file     Active member of club or organization: Not on file     Attends meetings of clubs or organizations: Not on file     Relationship status: Not on file    Intimate partner violence     Fear of current or ex partner: Not on file     Emotionally abused: Not on file     Physically abused: Not on file     Forced sexual activity: Not on file   Other Topics Concern    Not on file   Social History Narrative    Not on file       History reviewed. No pertinent family history. Allergies:  Patient has no known allergies. Home Medications:  Prior to Admission medications    Medication Sig Start Date End Date Taking?  Authorizing Provider   ibuprofen (ADVIL;MOTRIN) 800 MG tablet Take 1 tablet by mouth 2 times daily as needed for Pain 6/18/20  Yes Keo Oquendo, DO   acetaminophen (TYLENOL) 325 MG tablet Take 2 tablets by mouth every 6 hours as needed for Pain 6/18/20  Yes Keo Oquendo, DO   ibuprofen (IBU) 800 MG tablet Take 1 tablet by mouth every 8 hours as needed for Pain 1/6/20  Yes KASH Bucio CNP   butalbital-acetaminophen-caffeine (FIORICET, ESGIC) -40 MG per tablet Take 1 tablet by mouth every 4 hours as needed for Headaches 2/4/20   Mai Silva MD   tamsulosin [facility]) 0.4 MG capsule Take 1 capsule by mouth daily for 20 days 1/6/20 1/26/20  KASH Freeman CNP   clomiPHENE (CLOMID) 50 MG tablet TAKE 1 TABLET BY MOUTH DAILY ON DAYS 3 THROUGH 7 OF MENSTRUAL CYCLE 4/21/17 Historical Provider, MD       REVIEW OF SYSTEMS    (2-9 systems for level 4, 10 or more for level 5)      Review of Systems   Constitutional: Negative for chills, fatigue and fever. HENT: Negative for congestion and sore throat. Eyes: Negative for photophobia and visual disturbance. Respiratory: Positive for cough and shortness of breath. Cardiovascular: Negative for chest pain and palpitations. Gastrointestinal: Positive for nausea. Negative for abdominal pain and vomiting. Genitourinary: Negative for dysuria and hematuria. Musculoskeletal: Negative for arthralgias and myalgias. Skin: Negative for rash and wound. Neurological: Negative for weakness and numbness. PHYSICAL EXAM   (up to 7 for level 4, 8 or more for level 5)      INITIAL VITALS:   BP (!) 142/79   Pulse 64   Temp 97.9 °F (36.6 °C) (Oral)   Resp 15   Ht 5' 4\" (1.626 m)   Wt 150 lb (68 kg)   LMP 07/21/2020   SpO2 97%   BMI 25.75 kg/m²     Physical Exam  Vitals signs and nursing note reviewed. Constitutional:       General: She is not in acute distress. Appearance: She is well-developed. She is not diaphoretic. HENT:      Head: Normocephalic and atraumatic. Right Ear: External ear normal.      Left Ear: External ear normal.      Nose: Nose normal.      Mouth/Throat:      Mouth: Mucous membranes are moist.   Eyes:      Conjunctiva/sclera: Conjunctivae normal.   Neck:      Musculoskeletal: Normal range of motion and neck supple. No neck rigidity. Vascular: No JVD. Trachea: No tracheal deviation. Cardiovascular:      Rate and Rhythm: Normal rate and regular rhythm. Pulses: Normal pulses. Heart sounds: Normal heart sounds, S1 normal and S2 normal. No murmur. No friction rub. No gallop. Pulmonary:      Effort: Pulmonary effort is normal. No respiratory distress. Breath sounds: Normal breath sounds. Abdominal:      General: Abdomen is flat. There is no distension.       Palpations: Abdomen is soft. Tenderness: There is no abdominal tenderness. There is no guarding or rebound. Musculoskeletal: Normal range of motion. General: No tenderness. Skin:     General: Skin is warm and dry. Capillary Refill: Capillary refill takes less than 2 seconds. Neurological:      Mental Status: She is alert and oriented to person, place, and time. Motor: No abnormal muscle tone.          DIFFERENTIAL  DIAGNOSIS     PLAN (LABS / IMAGING / EKG):  Orders Placed This Encounter   Procedures    XR CHEST PORTABLE    Basic Metabolic Panel    CBC WITH AUTO DIFFERENTIAL    EKG 12 Lead    EKG REPORT       MEDICATIONS ORDERED:  Orders Placed This Encounter   Medications    benzonatate (TESSALON) capsule 100 mg       DDX: bronchitis, viral URI, COVID, and gastroenteritis    DIAGNOSTIC RESULTS / EMERGENCY DEPARTMENT COURSE / MDM     LABS:  Results for orders placed or performed during the hospital encounter of 67/81/43   Basic Metabolic Panel   Result Value Ref Range    Glucose 94 70 - 99 mg/dL    BUN 9 6 - 20 mg/dL    CREATININE 0.59 0.50 - 0.90 mg/dL    Bun/Cre Ratio NOT REPORTED 9 - 20    Calcium 9.2 8.6 - 10.4 mg/dL    Sodium 137 135 - 144 mmol/L    Potassium 4.3 3.7 - 5.3 mmol/L    Chloride 102 98 - 107 mmol/L    CO2 24 20 - 31 mmol/L    Anion Gap 11 9 - 17 mmol/L    GFR Non-African American >60 >60 mL/min    GFR African American >60 >60 mL/min    GFR Comment          GFR Staging NOT REPORTED    CBC WITH AUTO DIFFERENTIAL   Result Value Ref Range    WBC 8.3 3.5 - 11.3 k/uL    RBC 5.32 (H) 3.95 - 5.11 m/uL    Hemoglobin 15.9 (H) 11.9 - 15.1 g/dL    Hematocrit 46.7 36.3 - 47.1 %    MCV 87.8 82.6 - 102.9 fL    MCH 29.9 25.2 - 33.5 pg    MCHC 34.0 28.4 - 34.8 g/dL    RDW 13.0 11.8 - 14.4 %    Platelets 691 160 - 807 k/uL    MPV 9.9 8.1 - 13.5 fL    NRBC Automated 0.0 0.0 per 100 WBC    Differential Type NOT REPORTED     Seg Neutrophils 55 36 - 65 %    Lymphocytes 29 24 - 43 %    Monocytes 10 3 - 12 %    Eosinophils % 4 1 - 4 %    Basophils 1 0 - 2 %    Immature Granulocytes 1 (H) 0 %    Segs Absolute 4.68 1.50 - 8.10 k/uL    Absolute Lymph # 2.38 1.10 - 3.70 k/uL    Absolute Mono # 0.82 0.10 - 1.20 k/uL    Absolute Eos # 0.34 0.00 - 0.44 k/uL    Basophils Absolute 0.06 0.00 - 0.20 k/uL    Absolute Immature Granulocyte 0.04 0.00 - 0.30 k/uL    WBC Morphology NOT REPORTED     RBC Morphology NOT REPORTED     Platelet Estimate NOT REPORTED    EKG 12 Lead   Result Value Ref Range    Ventricular Rate 77 BPM    Atrial Rate 77 BPM    P-R Interval 158 ms    QRS Duration 80 ms    Q-T Interval 398 ms    QTc Calculation (Bazett) 450 ms    P Axis 45 degrees    R Axis 67 degrees    T Axis 59 degrees         RADIOLOGY:  Xr Chest Portable    Result Date: 7/21/2020  EXAMINATION: ONE XRAY VIEW OF THE CHEST 7/21/2020 12:34 pm COMPARISON: 05/17/2016 HISTORY: ORDERING SYSTEM PROVIDED HISTORY: shortness of breath, cough TECHNOLOGIST PROVIDED HISTORY: shortness of breath, cough FINDINGS: Cardiac and mediastinal silhouettes appear within normal limits for size. Pulmonary vascularity is normal.  No parenchymal consolidation or pleural effusion is identified. No pneumothorax is seen. No acute osseous abnormality is identified. Similar appearing chest to 2016, with no focal acute infiltrate, pneumothorax or pulmonary edema identified. EKG  EKG Interpretation    Interpreted by emergency department physician    Rhythm: normal sinus   Rate: normal  Axis: normal  Ectopy: none  Conduction: normal  ST Segments: normal  T Waves: normal  Q Waves: none    Clinical Impression: normal ecg    Quyen Fear      All EKG's are interpreted by the Emergency Department Physician who either signs or Co-signs this chart in the absence of a cardiologist.    EMERGENCY DEPARTMENT COURSE:  ED Course as of Jul 21 1345   Tue Jul 21, 2020   1208 Patient is a 56-year-old female presenting for cough and shortness of breath.   Patient appears to be no acute distress nontoxic-appearing. Patient vitals reveal slight tachypnea of 22 with 98% on room air but pulse is normal 82, afebrile at 97.9 °F and slightly hypertensive at 142/79. Lung sounds are clear bilaterally. Regular rate and rhythm with normal S1-S2 heart sounds with no murmurs rubs or gallops. Abdomen soft nontender with no guarding/rigidity/rebound tenderness. Cap refill less than 2 seconds. Bilateral calves are nontender palpation with no edema in the lower extremities. Concern for bronchitis, viral URI, COVID, and gastroenteritis. I have no suspicion for PE as patient is PERC negative. Order CBC, BMP, Tessalon Perles, chest x-ray, and EKG. [CS]   6548 CBC is non-concerning.    [CS]   3820 BMP is non-concerning.    [CS]   9202 Chest x-ray is negative for any acute cardiopulmonary process. [CS]   7719 Patient much improved for a minute and desatted down to 91% on room air with a poor waveform and within 5 seconds of sitting down her pulse ox was 98% with a great waveform. Pt reports feeling the same, and would like to go home. .Plan to discharge patient home.    [CS]      ED Course User Index  [CS] Lucy Chavez DO     Patient was agreeable to discharge plan. Patient was educated to her precautions. Patient ambulated out of the ER under her own power. PROCEDURES:  None    CONSULTS:  None    CRITICAL CARE:  None    FINAL IMPRESSION      1. Cough    2. Shortness of breath    3.  Nausea vomiting and diarrhea          DISPOSITION / PLAN     DISPOSITION Decision To Discharge 07/21/2020 01:14:44 PM      PATIENT REFERRED TO:4  [facility]  [address] [place] [address]  [phone]  Go to   If symptoms worsen      DISCHARGE MEDICATIONS:  Discharge Medication List as of 7/21/2020  1:23 PM          Lucy Chavez DO  Emergency Medicine Resident    (Please note that portions of thisnote were completed with a voice recognition program.  Efforts were made to edit the dictations but occasionally words are mis-transcribed.)        Melinda Dillon, DO  Resident  07/22/20 0080

## 2020-07-21 NOTE — ED PROVIDER NOTES
[facility] ED     Emergency Department     Faculty Attestation        I performed a history and physical examination of the patient and discussed management with the resident. I reviewed the residents note and agree with the documented findings and plan of care. Any areas of disagreement are noted on the chart. I was personally present for the key portions of any procedures. I have documented in the chart those procedures where I was not present during the key portions. I have reviewed the emergency nurses triage note. I agree with the chief complaint, past medical history, past surgical history, allergies, medications, social and family history as documented unless otherwise noted below. For Physician Assistant/ Nurse Practitioner cases/documentation I have personally evaluated this patient and have completed at least one if not all key elements of the E/M (history, physical exam, and MDM). Additional findings are as noted. Vital Signs: BP: (!) 142/79  Pulse: 82  Resp: 22  Temp: 97.9 °F (36.6 °C) SpO2: 98 %  PCP:  Epifanio Sherman MD    Pertinent Comments:           EKG Interpretation    Interpreted by emergency department physician    Rhythm: normal sinus   Rate: normal  Axis: normal  Conduction: normal  ST Segments: no acute change  T Waves: no acute change  Q Waves: no acute change    Clinical Impression:  nonspecific EKG. Critical Care  None    This patient was evaluated in the Emergency Department for symptoms described in the history of present illness. He/she was evaluated in the context of the global COVID-19 pandemic, which necessitated consideration that the patient might be at risk for infection with the SARS-CoV-2 virus that causes COVID-19.  Institutional protocols and algorithms that pertain to the evaluation of patients at risk for COVID-19 are in a state of rapid change based on information released by regulatory bodies including the CDC and federal and state organizations. These policies and algorithms were followed during the patient's care in the ED. (Please note that portions of this note were completed with a voice recognition program. Efforts were made to edit the dictations but occasionally words are mis-transcribed.  Whenever words are used in this note in any gender, they shall be construed as though they were used in the gender appropriate to the circumstances; and whenever words are used in this note in the singular or plural form, they shall be construed as though they were used in the form appropriate to the circumstances.)    MD Saad Anderson  Attending Emergency Medicine Physician           Nikki Arenas MD  07/21/20 Via Alec Boss MD  07/21/20 8032

## 2020-07-21 NOTE — ED TRIAGE NOTES
Pt sts she has a headache on and off that she take Tylenol and motrin for. Pt sts she has been coughing x couple days and now it makes her chest hurt. Pt reported nausea with emesis, last episode yesterday. Pt able to speak in complete sentences without distress. Lungs clear but diminished. Pt is alert Ox3 and cooperative.

## 2020-07-22 ENCOUNTER — CARE COORDINATION (OUTPATIENT)
Dept: CARE COORDINATION | Age: 30
End: 2020-07-22

## 2020-07-22 LAB
EKG ATRIAL RATE: 77 BPM
EKG P AXIS: 45 DEGREES
EKG P-R INTERVAL: 158 MS
EKG Q-T INTERVAL: 398 MS
EKG QRS DURATION: 80 MS
EKG QTC CALCULATION (BAZETT): 450 MS
EKG R AXIS: 67 DEGREES
EKG T AXIS: 59 DEGREES
EKG VENTRICULAR RATE: 77 BPM

## 2020-07-22 PROCEDURE — 93010 ELECTROCARDIOGRAM REPORT: CPT | Performed by: INTERNAL MEDICINE

## 2020-07-22 NOTE — CARE COORDINATION
Patient contacted regarding COVID-19 exposure. Discussed COVID-19 related testing which was not done at this time. Test results were not done. Patient informed of results, if available? Has appt on Thur for test.    Care Transition Nurse/ Ambulatory Care Manager contacted the patient by telephone to perform post discharge assessment. Verified name and  with patient as identifiers. Provided introduction to self, and explanation of the CTN/ACM role, and reason for call due to risk factors for infection and/or exposure to COVID-19. Symptoms reviewed with patient who verbalized the following symptoms: no worsening symptoms. Due to no new or worsening symptoms encounter was not routed to provider for escalation. Discussed follow-up appointments. If no appointment was previously scheduled, appointment scheduling offered: Yes  [address] follow up appointment(s): No future appointments. [facility] appointment[facility]s):      Advance Care Planning:   Does patient have an Advance Directive:  decision maker updated. Patient has following risk factors of: no known risk factors. CTN/ACM reviewed discharge instructions, medical action plan and red flags such as increased shortness of breath, increasing fever and signs of decompensation with patient who verbalized understanding. Discussed exposure protocols and quarantine with CDC Guidelines What to do if you are sick with coronavirus disease 2019.  Patient was given an opportunity for questions and concerns. The patient agrees to contact the [facility] exposure line [phone], local [facility] department [place] [facility]: ([phone]) and PCP office for questions related to their healthcare. CTN/ACM provided contact information for future needs.     Reviewed and educated patient on any new and changed medications related to discharge diagnosis     Patient/family/caregiver given information for Rashida Tinoco and agrees to enroll yes  Patient's preferred e-mail: [email or web address]   Patient's preferred phone number: [phone]  Based on Loop alert triggers, patient will be contacted by nurse care manager for worsening symptoms. Pt will be further monitored by COVID Loop Team based on severity of symptoms and risk factors. Patient states she has appt for testing on thur. She does not have a provider and was thinking of going to clinic [address] where her friend goes. Gave her the [phone] number she can also call for a new provider.    Enrolled in Loop program.

## 2022-03-18 ENCOUNTER — HOSPITAL ENCOUNTER (OUTPATIENT)
Age: 32
Setting detail: SPECIMEN
Discharge: HOME | End: 2022-03-18

## 2022-03-18 ENCOUNTER — OFFICE VISIT (OUTPATIENT)
Dept: FAMILY MEDICINE CLINIC | Age: 32
End: 2022-03-18
Payer: MEDICARE

## 2022-03-18 VITALS
HEART RATE: 67 BPM | DIASTOLIC BLOOD PRESSURE: 56 MMHG | BODY MASS INDEX: 31.8 KG/M2 | SYSTOLIC BLOOD PRESSURE: 96 MMHG | WEIGHT: 162 LBS | HEIGHT: 60 IN

## 2022-03-18 DIAGNOSIS — Z13.220 SCREENING FOR HYPERLIPIDEMIA: ICD-10-CM

## 2022-03-18 DIAGNOSIS — Z11.59 NEED FOR HEPATITIS C SCREENING TEST: Primary | ICD-10-CM

## 2022-03-18 DIAGNOSIS — Z11.59 NEED FOR HEPATITIS C SCREENING TEST: ICD-10-CM

## 2022-03-18 LAB
CHOLESTEROL/HDL RATIO: 4.8
CHOLESTEROL: 244 MG/DL
HDLC SERPL-MCNC: 51 MG/DL
HEPATITIS C ANTIBODY: NONREACTIVE
LDL CHOLESTEROL: 174 MG/DL (ref 0–130)
TRIGL SERPL-MCNC: 97 MG/DL

## 2022-03-18 PROCEDURE — 99395 PREV VISIT EST AGE 18-39: CPT | Performed by: STUDENT IN AN ORGANIZED HEALTH CARE EDUCATION/TRAINING PROGRAM

## 2022-03-18 SDOH — ECONOMIC STABILITY: HOUSING INSECURITY
IN THE LAST 12 MONTHS, WAS THERE A TIME WHEN YOU DID NOT HAVE A STEADY PLACE TO SLEEP OR SLEPT IN A SHELTER (INCLUDING NOW)?: NO

## 2022-03-18 SDOH — ECONOMIC STABILITY: FOOD INSECURITY: WITHIN THE PAST 12 MONTHS, THE FOOD YOU BOUGHT JUST DIDN'T LAST AND YOU DIDN'T HAVE MONEY TO GET MORE.: NEVER TRUE

## 2022-03-18 SDOH — ECONOMIC STABILITY: TRANSPORTATION INSECURITY
IN THE PAST 12 MONTHS, HAS THE LACK OF TRANSPORTATION KEPT YOU FROM MEDICAL APPOINTMENTS OR FROM GETTING MEDICATIONS?: NO

## 2022-03-18 SDOH — HEALTH STABILITY: PHYSICAL HEALTH: ON AVERAGE, HOW MANY MINUTES DO YOU ENGAGE IN EXERCISE AT THIS LEVEL?: 150+ MIN

## 2022-03-18 SDOH — ECONOMIC STABILITY: HOUSING INSECURITY: IN THE LAST 12 MONTHS, HOW MANY PLACES HAVE YOU LIVED?: 1

## 2022-03-18 SDOH — ECONOMIC STABILITY: INCOME INSECURITY: IN THE LAST 12 MONTHS, WAS THERE A TIME WHEN YOU WERE NOT ABLE TO PAY THE MORTGAGE OR RENT ON TIME?: NO

## 2022-03-18 SDOH — ECONOMIC STABILITY: FOOD INSECURITY: WITHIN THE PAST 12 MONTHS, YOU WORRIED THAT YOUR FOOD WOULD RUN OUT BEFORE YOU GOT MONEY TO BUY MORE.: NEVER TRUE

## 2022-03-18 SDOH — ECONOMIC STABILITY: TRANSPORTATION INSECURITY
IN THE PAST 12 MONTHS, HAS LACK OF TRANSPORTATION KEPT YOU FROM MEETINGS, WORK, OR FROM GETTING THINGS NEEDED FOR DAILY LIVING?: NO

## 2022-03-18 SDOH — HEALTH STABILITY: PHYSICAL HEALTH: ON AVERAGE, HOW MANY DAYS PER WEEK DO YOU ENGAGE IN MODERATE TO STRENUOUS EXERCISE (LIKE A BRISK WALK)?: 5 DAYS

## 2022-03-18 ASSESSMENT — PATIENT HEALTH QUESTIONNAIRE - PHQ9
5. POOR APPETITE OR OVEREATING: 0
4. FEELING TIRED OR HAVING LITTLE ENERGY: 0
3. TROUBLE FALLING OR STAYING ASLEEP: 0
6. FEELING BAD ABOUT YOURSELF - OR THAT YOU ARE A FAILURE OR HAVE LET YOURSELF OR YOUR FAMILY DOWN: 0
9. THOUGHTS THAT YOU WOULD BE BETTER OFF DEAD, OR OF HURTING YOURSELF: 0
SUM OF ALL RESPONSES TO PHQ QUESTIONS 1-9: 0
SUM OF ALL RESPONSES TO PHQ QUESTIONS 1-9: 0
2. FEELING DOWN, DEPRESSED OR HOPELESS: 0
1. LITTLE INTEREST OR PLEASURE IN DOING THINGS: NOT AT ALL
SUM OF ALL RESPONSES TO PHQ9 QUESTIONS 1 & 2: 0
1. LITTLE INTEREST OR PLEASURE IN DOING THINGS: 0
DEPRESSION UNABLE TO ASSESS: YES
SUM OF ALL RESPONSES TO PHQ9 QUESTIONS 1 & 2: 0
SUM OF ALL RESPONSES TO PHQ QUESTIONS 1-9: 0
8. MOVING OR SPEAKING SO SLOWLY THAT OTHER PEOPLE COULD HAVE NOTICED. OR THE OPPOSITE, BEING SO FIGETY OR RESTLESS THAT YOU HAVE BEEN MOVING AROUND A LOT MORE THAN USUAL: 0
10. IF YOU CHECKED OFF ANY PROBLEMS, HOW DIFFICULT HAVE THESE PROBLEMS MADE IT FOR YOU TO DO YOUR WORK, TAKE CARE OF THINGS AT HOME, OR GET ALONG WITH OTHER PEOPLE: 0
SUM OF ALL RESPONSES TO PHQ QUESTIONS 1-9: 0
7. TROUBLE CONCENTRATING ON THINGS, SUCH AS READING THE NEWSPAPER OR WATCHING TELEVISION: 0
2. FEELING DOWN, DEPRESSED OR HOPELESS: NOT AT ALL

## 2022-03-18 ASSESSMENT — LIFESTYLE VARIABLES: HOW OFTEN DO YOU HAVE A DRINK CONTAINING ALCOHOL: NEVER

## 2022-03-18 ASSESSMENT — ENCOUNTER SYMPTOMS
ABDOMINAL DISTENTION: 0
CONSTIPATION: 0
DIARRHEA: 0
SHORTNESS OF BREATH: 0
APNEA: 0
ABDOMINAL PAIN: 0
BACK PAIN: 0
CHEST TIGHTNESS: 0
VOMITING: 0
NAUSEA: 0

## 2022-03-18 ASSESSMENT — SOCIAL DETERMINANTS OF HEALTH (SDOH)
HOW OFTEN DO YOU ATTENT MEETINGS OF THE CLUB OR ORGANIZATION YOU BELONG TO?: NEVER
WITHIN THE LAST YEAR, HAVE YOU BEEN HUMILIATED OR EMOTIONALLY ABUSED IN OTHER WAYS BY YOUR PARTNER OR EX-PARTNER?: NO
DO YOU BELONG TO ANY CLUBS OR ORGANIZATIONS SUCH AS CHURCH GROUPS UNIONS, FRATERNAL OR ATHLETIC GROUPS, OR SCHOOL GROUPS?: NO
HOW OFTEN DO YOU ATTEND CHURCH OR RELIGIOUS SERVICES?: NEVER
WITHIN THE LAST YEAR, HAVE TO BEEN RAPED OR FORCED TO HAVE ANY KIND OF SEXUAL ACTIVITY BY YOUR PARTNER OR EX-PARTNER?: NO
WITHIN THE LAST YEAR, HAVE YOU BEEN AFRAID OF YOUR PARTNER OR EX-PARTNER?: NO
HOW HARD IS IT FOR YOU TO PAY FOR THE VERY BASICS LIKE FOOD, HOUSING, MEDICAL CARE, AND HEATING?: NOT HARD AT ALL
WITHIN THE LAST YEAR, HAVE YOU BEEN KICKED, HIT, SLAPPED, OR OTHERWISE PHYSICALLY HURT BY YOUR PARTNER OR EX-PARTNER?: NO
IN A TYPICAL WEEK, HOW MANY TIMES DO YOU TALK ON THE PHONE WITH FAMILY, FRIENDS, OR NEIGHBORS?: MORE THAN THREE TIMES A WEEK
HOW OFTEN DO YOU GET TOGETHER WITH FRIENDS OR RELATIVES?: MORE THAN THREE TIMES A WEEK

## 2022-03-18 NOTE — PROGRESS NOTES
Subjective:    Vonnie José is a 28 y.o. female with  has a past medical history of Anxiety, Bipolar disorder ([address] Depression, and Kidney stones. Presented to the office today for:  Chief Complaint   Patient presents with    New Patient     regular check up has not been seen in awhile been to see the cardiologist       HPI  Is a 28-year-old female seen today to establish care. Patient is currently not taking any medication, accompanied by her partner. Patient agreeable to health maintenance, up-to-date on most vaccinations and will get record for the rest.  Patient went to cardiologist 3 years ago when she was told that she had cholesterol that was taken her blood, will get repeat lipid panel today. States she used to see psychiatrist for issues with anxiety, depression and PTSD and will reach out again to [facility] to establish care. No other concerns today. Review of Systems   Constitutional: Negative for activity change, appetite change, chills and fever. Respiratory: Negative for apnea, chest tightness and shortness of breath. Cardiovascular: Negative for chest pain, palpitations and leg swelling. Gastrointestinal: Negative for abdominal distention, abdominal pain, constipation, diarrhea, nausea and vomiting. Genitourinary: Negative for difficulty urinating, frequency and hematuria. Musculoskeletal: Negative for arthralgias, back pain and neck pain. Neurological: Negative for dizziness, tremors, facial asymmetry, light-headedness and headaches. Psychiatric/Behavioral: Negative for agitation, behavioral problems and confusion. The patient is not nervous/anxious. The patient has a No family history on file.     Objective:    BP (!) 96/56 (Site: Left Upper Arm, Position: Sitting, Cuff Size: Medium Adult)   Pulse 67   Ht 5' (1.524 m)   Wt 162 lb (73.5 kg)   BMI 31.64 kg/m²    BP Readings from Last 3 Encounters:   03/18/22 (!) 96/56   07/21/20 (!) 142/79 06/18/20 139/85       Physical Exam  Vitals and nursing note reviewed. Constitutional:       Appearance: Normal appearance. She is obese. Cardiovascular:      Rate and Rhythm: Normal rate and regular rhythm. Pulses: Normal pulses. Heart sounds: Normal heart sounds. Pulmonary:      Effort: Pulmonary effort is normal.      Breath sounds: Normal breath sounds. Abdominal:      General: Abdomen is flat. Bowel sounds are normal.      Palpations: Abdomen is soft. Neurological:      General: No focal deficit present. Mental Status: She is alert and oriented to person, place, and time. Mental status is at baseline. Psychiatric:         Mood and Affect: Mood normal.         Behavior: Behavior normal.         Thought Content: Thought content normal.         Judgment: Judgment normal.         Lab Results   Component Value Date    WBC 8.3 07/21/2020    HGB 15.9 (H) 07/21/2020    HCT 46.7 07/21/2020     07/21/2020    CHOL 227 (H) 05/25/2012    TRIG 169 (H) 05/25/2012    HDL 40 (L) 05/25/2012    ALT 10 01/06/2020    AST 12 01/06/2020     07/21/2020    K 4.3 07/21/2020     07/21/2020    CREATININE 0.59 07/21/2020    BUN 9 07/21/2020    CO2 24 07/21/2020    TSH 2.08 05/25/2012     Lab Results   Component Value Date    CALCIUM 9.2 07/21/2020     Lab Results   Component Value Date    LDLCHOLESTEROL 153 (H) 05/25/2012       Assessment and Plan:    1. Need for hepatitis C screening test  - Hepatitis C Antibody; Future    2. Screening for hyperlipidemia  - Lipid Panel;  Future      Requested Prescriptions      No prescriptions requested or ordered in this encounter       Medications Discontinued During This Encounter   Medication Reason    ibuprofen (ADVIL;MOTRIN) 800 MG tablet LIST CLEANUP    butalbital-acetaminophen-caffeine (FIORICET, ESGIC) -40 MG per tablet LIST CLEANUP    acetaminophen (TYLENOL) 325 MG tablet LIST CLEANUP    ibuprofen (IBU) 800 MG tablet LIST CLEANUP    clomiPHENE (CLOMID) 50 MG tablet LIST CLEANUP       Gila received counseling on the following healthy behaviors: nutrition, exercise and medication adherence    Discussed use,benefit, and side effects of prescribed medications. Barriers to medication compliance addressed. All patient questions answered. Pt voiced understanding. Return in about 10 days (around 3/28/2022) for pap smear with dr Shant Matthews . Disclaimer: Some orall of this note was transcribed using voice-recognition software. This may cause typographical errors occasionally. Although all effort is made to fix these errors, please do not hesitate to contact our office if there Mohamud Pro concern with the understanding of this note.

## 2022-03-18 NOTE — PROGRESS NOTES
Visit Information    Have you changed or started any medications since your last visit including any over-the-counter medicines, vitamins, or herbal medicines? no   Are you having any side effects from any of your medications? -  no  Have you stopped taking any of your medications? Is so, why? -  no    Have you seen any other physician or provider since your last visit? no  Have you had any other diagnostic tests since your last visit? No  Have you been seen in the emergency room and/or had an admission to a hospital since we last saw you? No  Have you had your routine dental cleaning in the past 6 months? no    Have you activated your [employer] account? If not, what are your barriers?  No:      No care team member to display    Medical History Review  Past Medical, Family, and Social History reviewed and does not contribute to the patient presenting condition    Health Maintenance   Topic Date Due    Hepatitis C screen  Never done    Varicella vaccine (1 of 2 - 2-dose childhood series) Never done    Hepatitis B vaccine (2 of 3 - 3-dose primary series) 08/19/1997    Pneumococcal 0-64 years Vaccine (1 of 2 - PPSV23) Never done    DTaP/Tdap/Td vaccine (5 - Tdap) 11/22/2002    Depression Monitoring  Never done    Flu vaccine (1) Never done    Cervical cancer screen  11/22/2021    COVID-19 Vaccine (3 - Booster for Pfizer series) 04/29/2022    Hib vaccine  Completed    Hepatitis A vaccine  Aged Out    Meningococcal (ACWY) vaccine  Aged Out    HIV screen  Discontinued

## 2022-03-18 NOTE — PROGRESS NOTES
I have reviewed and discussed key elements of [address] with the resident including plan of care and follow up and agree with the care paul plan. Past Medical History:   Diagnosis Date    Anxiety     Bipolar disorder ([facility] [address]     Depression     Kidney stones         Diagnosis Orders   1. Need for hepatitis C screening test  Hepatitis C Antibody   2.  Screening for hyperlipidemia  Lipid Panel

## 2022-03-18 NOTE — PATIENT INSTRUCTIONS
Thank you for letting us take care of you today. We hope all your questions were addressed. If a question was overlooked or something else comes to mind after you return home, please contact a member of your Care Team listed below. Your Care Team at [place] [address] is Team #5  Patrick Green MD (Faculty)  Clau Major MD (Resident)  Alysia Box MD (Resident)  Janis Claudio MD (Resident)  Enrrique Malcolm MD (Resident)  Shon Prince., STEPHENIE Bello., BRENNAN Lama, Gregorio Metzger., [facility] office)  Minh Zarate [place] (Clinical Practice Manager)  Myriam Leonardo, [address] [place] [address] (Clinical Pharmacist)       Office phone number: [phone]    If you need to get in right away due to illness, please be advised we have \"Same Day\" appointments available Monday-Friday. Please call us at [phone] option #3 to schedule your \"Same Day\" appointment.

## 2022-04-17 PROBLEM — Z11.59 NEED FOR HEPATITIS C SCREENING TEST: Status: RESOLVED | Noted: 2022-03-18 | Resolved: 2022-04-17

## 2022-04-17 PROBLEM — Z13.220 SCREENING FOR HYPERLIPIDEMIA: Status: RESOLVED | Noted: 2022-03-18 | Resolved: 2022-04-17

## 2022-10-27 ENCOUNTER — OFFICE VISIT (OUTPATIENT)
Dept: FAMILY MEDICINE CLINIC | Age: 32
End: 2022-10-27
Payer: MEDICARE

## 2022-10-27 VITALS
TEMPERATURE: 98.2 F | DIASTOLIC BLOOD PRESSURE: 64 MMHG | SYSTOLIC BLOOD PRESSURE: 105 MMHG | HEART RATE: 64 BPM | BODY MASS INDEX: 31.25 KG/M2 | WEIGHT: 160 LBS

## 2022-10-27 DIAGNOSIS — H66.001 NON-RECURRENT ACUTE SUPPURATIVE OTITIS MEDIA OF RIGHT EAR WITHOUT SPONTANEOUS RUPTURE OF TYMPANIC MEMBRANE: Primary | ICD-10-CM

## 2022-10-27 PROCEDURE — G8417 CALC BMI ABV UP PARAM F/U: HCPCS | Performed by: STUDENT IN AN ORGANIZED HEALTH CARE EDUCATION/TRAINING PROGRAM

## 2022-10-27 PROCEDURE — 99213 OFFICE O/P EST LOW 20 MIN: CPT | Performed by: STUDENT IN AN ORGANIZED HEALTH CARE EDUCATION/TRAINING PROGRAM

## 2022-10-27 PROCEDURE — G8484 FLU IMMUNIZE NO ADMIN: HCPCS | Performed by: STUDENT IN AN ORGANIZED HEALTH CARE EDUCATION/TRAINING PROGRAM

## 2022-10-27 PROCEDURE — 4004F PT TOBACCO SCREEN RCVD TLK: CPT | Performed by: STUDENT IN AN ORGANIZED HEALTH CARE EDUCATION/TRAINING PROGRAM

## 2022-10-27 PROCEDURE — 99211 OFF/OP EST MAY X REQ PHY/QHP: CPT | Performed by: STUDENT IN AN ORGANIZED HEALTH CARE EDUCATION/TRAINING PROGRAM

## 2022-10-27 PROCEDURE — G8427 DOCREV CUR MEDS BY ELIG CLIN: HCPCS | Performed by: STUDENT IN AN ORGANIZED HEALTH CARE EDUCATION/TRAINING PROGRAM

## 2022-10-27 RX ORDER — AMOXICILLIN AND CLAVULANATE POTASSIUM 875; 125 MG/1; MG/1
1 TABLET, FILM COATED ORAL 2 TIMES DAILY
Qty: 10 TABLET | Refills: 0 | Status: SHIPPED | OUTPATIENT
Start: 2022-10-27 | End: 2022-11-01

## 2022-10-27 NOTE — PROGRESS NOTES
Visit Information    Have you changed or started any medications since your last visit including any over-the-counter medicines, vitamins, or herbal medicines? no   Have you stopped taking any of your medications? Is so, why? -  no  Are you having any side effects from any of your medications? - no    Have you seen any other physician or provider since your last visit?  no   Have you had any other diagnostic tests since your last visit?  no   Have you been seen in the emergency room and/or had an admission in a hospital since we last saw you?  no   Have you had your routine dental cleaning in the past 6 months?  no     Do you have an active MyChart account? If no, what is the barrier?   No:     Patient Care Team:  Aspen Murray MD as PCP - General (Family Medicine)    Medical History Review  Past Medical, Family, and Social History reviewed and does not contribute to the patient presenting condition    Health Maintenance   Topic Date Due    Varicella vaccine (1 of 2 - 2-dose childhood series) Never done    Pneumococcal 0-64 years Vaccine (1 - PCV) Never done    DTaP/Tdap/Td vaccine (5 - Tdap) 11/22/2002    Cervical cancer screen  11/22/2021    COVID-19 Vaccine (3 - Booster for Pfizer series) 01/24/2022    Flu vaccine (1) Never done    Depression Monitoring  03/18/2023    Hib vaccine  Completed    Hepatitis C screen  Completed    Hepatitis A vaccine  Aged Out    Meningococcal (ACWY) vaccine  Aged Out    HIV screen  Discontinued

## 2022-10-27 NOTE — PROGRESS NOTES
[facility] Medicine Residency Program - Outpatient Note      Subjective:      Amanda Navarro is a 28 y.o. female  presented to the office on 10/27/22 for right ear pain. The pain is since last 1 week, started without inciting event. Associated with mild tinnitus, feeling of fullness in the ear leading to decreased hearing on right side and mild headache. Denies vertigo, fever or chills or discharge. Used some otic drops available at home for 1 time without relief. Denies flu symptoms, pain behind ear, pain with chewing. Review of systems  Positive as mentioned above in subjective. All other systems negative. Objective:      Vitals:    10/27/22 0911   BP: 105/64   Pulse: 64   Temp: 98.2 °F (36.8 °C)       General Appearance - Alert and oriented x 3  HEENT -external ear canal appears normal without erythema or discharge. Intact tympanic membrane with slight bulge noted. Middle ear bony shadows lost.  No mastoid tenderness. No TMJ tenderness. Assessment:        ICD-10-CM    1. Non-recurrent acute suppurative otitis media of right ear without spontaneous rupture of tympanic membrane  H66.001 amoxicillin-clavulanate (AUGMENTIN) 875-125 MG per tablet          Plan:    Mild otitis media-start Augmentin for 5 days. Call us back if no improvement in 1 week. Return in about 1 week (around 11/3/2022), or if symptoms worsen or fail to improve, for R ear pain f/u. Requested Prescriptions     Signed Prescriptions Disp Refills    amoxicillin-clavulanate (AUGMENTIN) 875-125 MG per tablet 10 tablet 0     Sig: Take 1 tablet by mouth 2 times daily for 5 days       There are no discontinued medications. Discussed use, benefit, and side effects of prescribed medications. Barriers to medication compliance addressed. All patient questions answered. Pt voiced understanding.        Disclaimer: Some orall of this note was transcribed using voice-recognition software. This may cause typographical errors occasionally. Although all effort is made to fix these errors, please do not hesitate to contact our office if there Jessica Hatfield concern with the understanding of this note.     Alma Delia Renee MD  Family Medicine PGY-3  10/27/22 at 9:35 AM

## 2022-11-04 NOTE — PROGRESS NOTES
Attending Physician Statement    Wt Readings from Last 3 Encounters:   10/27/22 160 lb (72.6 kg)   03/18/22 162 lb (73.5 kg)   07/21/20 150 lb (68 kg)     Temp Readings from Last 3 Encounters:   10/27/22 98.2 °F (36.8 °C) (Oral)   07/21/20 97.9 °F (36.6 °C) (Oral)   06/18/20 98.2 °F (36.8 °C) (Oral)     BP Readings from Last 3 Encounters:   10/27/22 105/64   03/18/22 (!) 96/56   07/21/20 (!) 142/79     Pulse Readings from Last 3 Encounters:   10/27/22 64   03/18/22 67   07/21/20 64         I have discussed the care of [facility], including pertinent history and exam findings with the resident. I have reviewed the key elements of all parts of the encounter with the resident. I agree with the assessment, plan and orders as documented by the resident.   (GE Modifier)

## 2022-11-14 ENCOUNTER — OFFICE VISIT (OUTPATIENT)
Dept: FAMILY MEDICINE CLINIC | Age: 32
End: 2022-11-14
Payer: MEDICARE

## 2022-11-14 ENCOUNTER — HOSPITAL ENCOUNTER (OUTPATIENT)
Age: 32
Discharge: HOME | End: 2022-11-14
Payer: MEDICARE

## 2022-11-14 VITALS
SYSTOLIC BLOOD PRESSURE: 118 MMHG | DIASTOLIC BLOOD PRESSURE: 74 MMHG | WEIGHT: 153 LBS | HEART RATE: 79 BPM | BODY MASS INDEX: 29.88 KG/M2

## 2022-11-14 DIAGNOSIS — J02.9 SORE THROAT: ICD-10-CM

## 2022-11-14 DIAGNOSIS — J02.9 SORE THROAT: Primary | ICD-10-CM

## 2022-11-14 PROCEDURE — 99213 OFFICE O/P EST LOW 20 MIN: CPT

## 2022-11-14 PROCEDURE — 36415 COLL VENOUS BLD VENIPUNCTURE: CPT

## 2022-11-14 PROCEDURE — 86663 EPSTEIN-BARR ANTIBODY: CPT

## 2022-11-14 PROCEDURE — 87651 STREP A DNA AMP PROBE: CPT

## 2022-11-14 RX ORDER — AMOXICILLIN AND CLAVULANATE POTASSIUM 875; 125 MG/1; MG/1
1 TABLET, FILM COATED ORAL 2 TIMES DAILY
Qty: 20 TABLET | Refills: 0 | Status: SHIPPED | OUTPATIENT
Start: 2022-11-14 | End: 2022-11-24

## 2022-11-14 ASSESSMENT — ENCOUNTER SYMPTOMS
VOICE CHANGE: 0
TROUBLE SWALLOWING: 1
RHINORRHEA: 0
DIARRHEA: 1
SORE THROAT: 1
VOMITING: 0
SINUS PAIN: 0
ABDOMINAL PAIN: 1
SINUS PRESSURE: 0
FACIAL SWELLING: 0

## 2022-11-14 NOTE — PROGRESS NOTES
Visit Information    Have you changed or started any medications since your last visit including any over-the-counter medicines, vitamins, or herbal medicines? no   Have you stopped taking any of your medications? Is so, why? -  no  Are you having any side effects from any of your medications? - no    Have you seen any other physician or provider since your last visit?  no   Have you had any other diagnostic tests since your last visit?  no   Have you been seen in the emergency room and/or had an admission in a hospital since we last saw you?  yes - Urgent care 2 days ago. Have you had your routine dental cleaning in the past 6 months?  no     Do you have an active MyChart account? If no, what is the barrier?   Yes    Patient Care Team:  Vicente Webb MD as PCP - General (Family Medicine)    Medical History Review  Past Medical, Family, and Social History reviewed and does not contribute to the patient presenting condition    Health Maintenance   Topic Date Due    Varicella vaccine (1 of 2 - 2-dose childhood series) Never done    Pneumococcal 0-64 years Vaccine (1 - PCV) Never done    DTaP/Tdap/Td vaccine (5 - Tdap) 11/22/2002    Cervical cancer screen  11/22/2021    COVID-19 Vaccine (3 - Booster for Pfizer series) 01/24/2022    Flu vaccine (1) Never done    Depression Monitoring  03/18/2023    Hib vaccine  Completed    Hepatitis C screen  Completed    Hepatitis A vaccine  Aged Out    Meningococcal (ACWY) vaccine  Aged Out    HIV screen  Discontinued

## 2022-11-14 NOTE — PROGRESS NOTES
Subjective:    Ericka Thompson is a 28 y.o. female with  has a past medical history of Anxiety, Bipolar disorder ([address] Depression, and Kidney stones. Presented to the office today for:  Chief Complaint   Patient presents with    Otalgia     1 week follow up    Health Maintenance     Vaccines declined       Otalgia   Associated symptoms include abdominal pain, diarrhea and a sore throat. Pertinent negatives include no ear discharge, hearing loss, rash, rhinorrhea or vomiting. Follow-up on sore throat and ear pain    Ericka Thompson is a 28 y.o. female who presents for evaluation of sore throat. Associated symptoms include myalgias, nasal blockage, pain while swallowing, sore throat, and white spots in throat. Onset of symptoms was 3 weeks ago, gradually worsening since that time. She is not drinking much. She has had recent close exposure to her kids and girlfriend who has been sick recently with proven influenza A.      17 days ago on October/27 patient was seen in our office for right-sided ear pain, diagnosed with mild otitis media and given Augmentin for 5 days. Patient reported never get better and instead getting progressively worse, had high fever of 102 yesterday, worsening of the ear pain and now is bilaterally painful with a pressure-like feeling, patient also have worsening sore throat and unable to eat due to feeling that his throat sore throat, painful swallowing and decreased appetite. Patient also reported abdominal pain and diarrhea. Denied headache, eye pain, or visual changes. Review of Systems   Constitutional:  Positive for appetite change, fatigue and fever. Negative for chills, diaphoresis and unexpected weight change. HENT:  Positive for ear pain, sore throat and trouble swallowing. Negative for ear discharge, facial swelling, hearing loss, postnasal drip, rhinorrhea, sinus pressure, sinus pain, tinnitus and voice change.     Gastrointestinal:  Positive for abdominal pain 51 03/18/2022    ALT 10 01/06/2020    AST 12 01/06/2020     07/21/2020    K 4.3 07/21/2020     07/21/2020    CREATININE 0.59 07/21/2020    BUN 9 07/21/2020    CO2 24 07/21/2020    TSH 2.08 05/25/2012     Lab Results   Component Value Date    CALCIUM 9.2 07/21/2020     Lab Results   Component Value Date    LDLCHOLESTEROL 174 (H) 03/18/2022       Assessment and Plan:    1. Sore throat  -Rapid strep test was done yesterday in the urgent care negative  -We will order throat culture for strep. -Given patient sore throat, cervical lymphadenopathy, abdominal pain specially added to the left upper quadrant. We will send patient for EBV testing as well to rule out EBV. Patient was educated about mononucleosis. We will follow-up on the abdominal pain in the future, if not resolved will order ultrasound        - Strep A DNA probe, amplification; Future  - Tony-Barr Virus Early Antigen Antibody, IgG; Future  - CBC with Auto Differential; Future          Requested Prescriptions     Signed Prescriptions Disp Refills    amoxicillin-clavulanate (AUGMENTIN) 875-125 MG per tablet 20 tablet 0     Sig: Take 1 tablet by mouth 2 times daily for 10 days       There are no discontinued medications. Gila received counseling on the following healthy behaviors: nutrition, exercise and medication adherence    Discussed use,benefit, and side effects of prescribed medications. Barriers to medication compliance addressed. All patient questions answered. Pt voiced understanding. Return in about 1 week (around 11/21/2022), or if symptoms worsen or fail to improve. Disclaimer: Some orall of this note was transcribed using voice-recognition software. This may cause typographical errors occasionally. Although all effort is made to fix these errors, please do not hesitate to contact our office if there Dixon Earl concern with the understanding of this note.

## 2022-11-14 NOTE — PROGRESS NOTES
Attending Physician Statement  I  have discussed the care of AMILCAR [facility] including pertinent history and exam findings with the resident. I agree with the assessment, plan and orders as documented by the resident. Previous ear pain and treated with augmentin for presumtive otitis media  C/o fever and throat pain  Tonsillitis   Went to urget and negative for strep and flu  Tender ant cervical lymphadenopathy    /74 (Site: Right Upper Arm, Position: Sitting, Cuff Size: Medium Adult)   Pulse 79   Wt 153 lb (69.4 kg)   LMP 10/24/2022 (Exact Date)   BMI 29.88 kg/m²    BP Readings from Last 3 Encounters:   11/14/22 118/74   10/27/22 105/64   03/18/22 (!) 96/56     Wt Readings from Last 3 Encounters:   11/14/22 153 lb (69.4 kg)   10/27/22 160 lb (72.6 kg)   03/18/22 162 lb (73.5 kg)          Diagnosis Orders   1.  Sore throat  Strep A DNA probe, amplification    Tony-Barr Virus Early Antigen Antibody, IgG          Anton Zafar MD 11/14/2022 11:57 AM

## 2022-11-14 NOTE — PATIENT INSTRUCTIONS
Thank you for letting us take care of you today. We hope all your questions were addressed. If a question was overlooked or something else comes to mind after you return home, please contact a member of your Care Team listed below. Your Care Team at [place] [address] is Team #4  Vladimir Polanco MD (Faculty)  Italia Bowen MD (Resident)  Blanca Barger MD (Resident)  Valentina Larson MD (Resident)  Yolanda Corona MD (Resident)  Sanjeev OLMEDO,BECCA Sarabia., BRENNAN Chavez., Roberto Carlos Cummings., [facility] office)  [place], [place] (Clinical Practice Manager)  Wende Sicard, [address] [place] [address] (Clinical Pharmacist)       Office phone number: [phone]    If you need to get in right away due to illness, please be advised we have \"Same Day\" appointments available Monday-Friday. Please call us at [phone] option #3 to schedule your \"Same Day\" appointment.

## 2022-11-15 LAB
DIRECT EXAM: NORMAL
SPECIMEN DESCRIPTION: NORMAL

## 2022-11-16 ENCOUNTER — TELEPHONE (OUTPATIENT)
Dept: FAMILY MEDICINE CLINIC | Age: 32
End: 2022-11-16

## 2022-11-16 NOTE — PROGRESS NOTES
Called patient back regarding the negative strep throat culture. Pt still need to complete the course of antibiotics given unless she developed worsening diarrhea. Pt reported no much improvement in her symptoms and she would like to go back to work. Pt will benefit from decadron IM injection once to relieve symptoms especially odynophagia and tonsillar . Discussed with patient warning symptoms of tonsillar abscess and other complications of tonsillitis. High fever, severe headache, difficulty swallowing or throat closing causing trouble breathing will need immediate medical attention and possibly IV antibiotics. Letter off work written.

## 2022-11-16 NOTE — TELEPHONE ENCOUNTER
Pt would need letter stating her results for her strep test for her job. Pt works at [employer]. Pt would like to  in office once completed. Please contact pt at [phone].        (Writer informed pt would need to sign HOLLIE)

## 2022-11-17 DIAGNOSIS — J35.1 TONSILLAR HYPERTROPHY: Primary | ICD-10-CM

## 2022-11-17 LAB — EBV EARLY ANTIGEN IGG: 17 U/ML

## 2022-11-17 RX ORDER — DEXAMETHASONE SODIUM PHOSPHATE 10 MG/ML
10 INJECTION INTRAMUSCULAR; INTRAVENOUS ONCE
Qty: 1 ML | Refills: 0 | Status: SHIPPED | OUTPATIENT
Start: 2022-11-17 | End: 2022-11-17

## 2022-11-22 ENCOUNTER — OFFICE VISIT (OUTPATIENT)
Dept: FAMILY MEDICINE CLINIC | Age: 32
End: 2022-11-22
Payer: MEDICARE

## 2022-11-22 VITALS
WEIGHT: 157.4 LBS | DIASTOLIC BLOOD PRESSURE: 73 MMHG | BODY MASS INDEX: 30.9 KG/M2 | SYSTOLIC BLOOD PRESSURE: 106 MMHG | HEIGHT: 60 IN | HEART RATE: 68 BPM

## 2022-11-22 DIAGNOSIS — J03.90 ACUTE TONSILLITIS, UNSPECIFIED ETIOLOGY: ICD-10-CM

## 2022-11-22 DIAGNOSIS — H92.01 EAR PAIN, RIGHT: Primary | ICD-10-CM

## 2022-11-22 PROCEDURE — G8417 CALC BMI ABV UP PARAM F/U: HCPCS

## 2022-11-22 PROCEDURE — 99213 OFFICE O/P EST LOW 20 MIN: CPT

## 2022-11-22 PROCEDURE — 4004F PT TOBACCO SCREEN RCVD TLK: CPT

## 2022-11-22 PROCEDURE — G8484 FLU IMMUNIZE NO ADMIN: HCPCS

## 2022-11-22 PROCEDURE — G8427 DOCREV CUR MEDS BY ELIG CLIN: HCPCS

## 2022-11-22 RX ORDER — ECHINACEA PURPUREA EXTRACT 125 MG
1 TABLET ORAL PRN
Qty: 1 EACH | Refills: 3 | Status: SHIPPED | OUTPATIENT
Start: 2022-11-22

## 2022-11-22 RX ORDER — LORATADINE 10 MG/1
10 TABLET ORAL DAILY
Qty: 7 TABLET | Refills: 0 | Status: SHIPPED | OUTPATIENT
Start: 2022-11-22

## 2022-11-22 ASSESSMENT — ENCOUNTER SYMPTOMS
TROUBLE SWALLOWING: 0
SORE THROAT: 0
ABDOMINAL PAIN: 0
CHOKING: 0
RHINORRHEA: 0
VOICE CHANGE: 0

## 2022-11-22 ASSESSMENT — PATIENT HEALTH QUESTIONNAIRE - PHQ9
4. FEELING TIRED OR HAVING LITTLE ENERGY: 0
6. FEELING BAD ABOUT YOURSELF - OR THAT YOU ARE A FAILURE OR HAVE LET YOURSELF OR YOUR FAMILY DOWN: 0
10. IF YOU CHECKED OFF ANY PROBLEMS, HOW DIFFICULT HAVE THESE PROBLEMS MADE IT FOR YOU TO DO YOUR WORK, TAKE CARE OF THINGS AT HOME, OR GET ALONG WITH OTHER PEOPLE: 0
8. MOVING OR SPEAKING SO SLOWLY THAT OTHER PEOPLE COULD HAVE NOTICED. OR THE OPPOSITE, BEING SO FIGETY OR RESTLESS THAT YOU HAVE BEEN MOVING AROUND A LOT MORE THAN USUAL: 0
5. POOR APPETITE OR OVEREATING: 0
9. THOUGHTS THAT YOU WOULD BE BETTER OFF DEAD, OR OF HURTING YOURSELF: 0
7. TROUBLE CONCENTRATING ON THINGS, SUCH AS READING THE NEWSPAPER OR WATCHING TELEVISION: 0
1. LITTLE INTEREST OR PLEASURE IN DOING THINGS: 0
3. TROUBLE FALLING OR STAYING ASLEEP: 0
SUM OF ALL RESPONSES TO PHQ QUESTIONS 1-9: 0
SUM OF ALL RESPONSES TO PHQ9 QUESTIONS 1 & 2: 0
2. FEELING DOWN, DEPRESSED OR HOPELESS: 0
SUM OF ALL RESPONSES TO PHQ QUESTIONS 1-9: 0

## 2022-11-22 NOTE — PROGRESS NOTES
Attending Physician Statement  I have discussed the care of Rox Walton, 33 y.o. female,including pertinent history and exam findings,  with the resident Dr. Xiomara Lunsford MD.  History:  Chief Complaint   Patient presents with    Pharyngitis     Follow up has gotten better since antibiotics     Otalgia     Right ear clogged still     Patient is here for follow up on sore throat and right ear pain. I have reviewed the key elements of the encounter with the resident. Examination was done by resident as documented in residents note. BP Readings from Last 3 Encounters:   11/22/22 106/73   11/14/22 118/74   10/27/22 105/64     /73 (Site: Right Upper Arm, Position: Sitting, Cuff Size: Medium Adult)   Pulse 68   Ht 5' (1.524 m)   Wt 157 lb 6.4 oz (71.4 kg)   LMP 10/24/2022 (Exact Date)   BMI 30.74 kg/m²   Lab Results   Component Value Date    WBC 8.3 07/21/2020    HGB 15.9 (H) 07/21/2020    HCT 46.7 07/21/2020     07/21/2020    CHOL 244 (H) 03/18/2022    TRIG 97 03/18/2022    HDL 51 03/18/2022    ALT 10 01/06/2020    AST 12 01/06/2020     07/21/2020    K 4.3 07/21/2020     07/21/2020    CREATININE 0.59 07/21/2020    BUN 9 07/21/2020    CO2 24 07/21/2020    TSH 2.08 05/25/2012     Lab Results   Component Value Date    CALCIUM 9.2 07/21/2020     Lab Results   Component Value Date    LDLCHOLESTEROL 174 (H) 03/18/2022     I agree with the assessment, plan and diagnosis of    Diagnosis Orders   1. Ear pain, right  loratadine (CLARITIN) 10 MG tablet    sodium chloride (ALTAMIST SPRAY) 0.65 % nasal spray      2. Acute tonsillitis, unspecified etiology          I agree with  orders as documented by the resident. Recommendations:   Patient was counseled, physical exam is normal, advised to continue supportive treatment with Claritin and nasal spray. Return if symptoms persist or change. Return for Schedule for pap amear.    (Beth Escobar ) Dr. Marcos Martin MD

## 2022-11-22 NOTE — PROGRESS NOTES
Visit Information    Have you changed or started any medications since your last visit including any over-the-counter medicines, vitamins, or herbal medicines? no   Are you having any side effects from any of your medications? -  no  Have you stopped taking any of your medications? Is so, why? -  no    Have you seen any other physician or provider since your last visit? No  Have you had any other diagnostic tests since your last visit? No  Have you been seen in the emergency room and/or had an admission to a hospital since we last saw you? No  Have you had your routine dental cleaning in the past 6 months? no    Have you activated your [employer] account? If not, what are your barriers?  No:      Patient Care Team:  Brayden Patel MD as PCP - General (Family Medicine)    Medical History Review  Past Medical, Family, and Social History reviewed and does not contribute to the patient presenting condition    Health Maintenance   Topic Date Due    Varicella vaccine (1 of 2 - 2-dose childhood series) Never done    Pneumococcal 0-64 years Vaccine (1 - PCV) Never done    DTaP/Tdap/Td vaccine (5 - Tdap) 11/22/2002    Cervical cancer screen  11/22/2021    COVID-19 Vaccine (3 - Booster for Pfizer series) 01/24/2022    Flu vaccine (1) Never done    Depression Monitoring  03/18/2023    Hib vaccine  Completed    Hepatitis C screen  Completed    Hepatitis A vaccine  Aged Out    Meningococcal (ACWY) vaccine  Aged Out    HIV screen  Discontinued

## 2022-11-22 NOTE — PATIENT INSTRUCTIONS
Thank you for letting us take care of you today. We hope all your questions were addressed. If a question was overlooked or something else comes to mind after you return home, please contact a member of your Care Team listed below. Your Care Team at [place] [address] is Team #4  Hilary Altman MD (Faculty)  Ester Jones MD (Resident)  Earnesteen Rubinstein, MD (Resident)  Alice Cobian MD (Resident)  Suzanna Romo MD (Resident)  Sanjeev OLMEDO,BECCA Mcduffie., BRENNAN Hudson., Kyung Severance., [facility])  Tiana Crigler, [address] (Clinical Practice Manager)  Luis Alberto Lawson [facility] (Clinical Pharmacist)       Office phone number: [phone]    If you need to get in right away due to illness, please be advised we have \"Same Day\" appointments available Monday-Friday. Please call us at [phone] option #3 to schedule your \"Same Day\" appointment.

## 2022-11-22 NOTE — PROGRESS NOTES
Subjective:    Mohamud Graff is a 33 y.o. female with  has a past medical history of Anxiety, Bipolar disorder ([address] Depression, and Kidney stones. Presented to the office today for:  Chief Complaint   Patient presents with    Pharyngitis     Follow up has gotten better since antibiotics     Otalgia     Right ear clogged still       Pharyngitis  Associated symptoms include congestion. Pertinent negatives include no abdominal pain, chills, diaphoresis, fatigue, fever, numbness or sore throat. Otalgia   Pertinent negatives include no abdominal pain, ear discharge, rhinorrhea or sore throat. Follow-up on upper respiratory symptoms    Acute onset tonsillitis  -This is a follow-up visit after starting patient on Augmentin, finish 7 out of 10 days of antibiotic. Compliant with medications  -Initial symptoms fever, sore throats, swollen glands, purulent debris in throat, mouthbreathing, difficulty swallowing certain foods and bilateral ear pain. All symptoms resolved except for right eye pressure-like pain.  -I diagnosed patient a week ago with purulent tonsillopharyngitis, negative strep a and negative Tony-Barr virus. Review of Systems   Constitutional:  Negative for activity change, appetite change, chills, diaphoresis, fatigue, fever and unexpected weight change. HENT:  Positive for congestion and ear pain (Right side). Negative for dental problem, drooling, ear discharge, mouth sores, rhinorrhea, sneezing, sore throat, tinnitus, trouble swallowing and voice change. Respiratory:  Negative for choking. Gastrointestinal:  Negative for abdominal pain. Neurological:  Negative for tremors, light-headedness and numbness. The patient has a No family history on file.     Objective:    /73 (Site: Right Upper Arm, Position: Sitting, Cuff Size: Medium Adult)   Pulse 68   Ht 5' (1.524 m)   Wt 157 lb 6.4 oz (71.4 kg)   LMP 10/24/2022 (Exact Date)   BMI 30.74 kg/m²    BP Readings from Last 3 Encounters:   11/22/22 106/73   11/14/22 118/74   10/27/22 105/64       Physical Exam  HENT:      Right Ear: Hearing, ear canal and external ear normal. Tympanic membrane is bulging. Tympanic membrane is not injected, scarred, perforated or erythematous. Left Ear: Hearing, ear canal and external ear normal. Tympanic membrane is not injected, scarred, perforated, erythematous or bulging. Mouth/Throat:      Lips: Pink. Mouth: Mucous membranes are moist. No oral lesions. Pharynx: Oropharynx is clear. Tonsils: No tonsillar exudate or tonsillar abscesses. Cardiovascular:      Rate and Rhythm: Normal rate and regular rhythm. Pulses: Normal pulses. Heart sounds: Normal heart sounds. Pulmonary:      Effort: Pulmonary effort is normal.      Breath sounds: Normal breath sounds. Abdominal:      Palpations: Abdomen is soft. Tenderness: There is no abdominal tenderness. Lab Results   Component Value Date    WBC 8.3 07/21/2020    HGB 15.9 (H) 07/21/2020    HCT 46.7 07/21/2020     07/21/2020    CHOL 244 (H) 03/18/2022    TRIG 97 03/18/2022    HDL 51 03/18/2022    ALT 10 01/06/2020    AST 12 01/06/2020     07/21/2020    K 4.3 07/21/2020     07/21/2020    CREATININE 0.59 07/21/2020    BUN 9 07/21/2020    CO2 24 07/21/2020    TSH 2.08 05/25/2012     Lab Results   Component Value Date    CALCIUM 9.2 07/21/2020     Lab Results   Component Value Date    LDLCHOLESTEROL 174 (H) 03/18/2022       Assessment and Plan:    1. Ear pain, right likely secondary to congestion given recent upper respiratory tract infection  - Bulged tympanic membrane on the right side  - loratadine (CLARITIN) 10 MG tablet; Take 1 tablet by mouth daily  Dispense: 7 tablet; Refill: 0  - sodium chloride (ALTAMIST SPRAY) 0.65 % nasal spray; 1 spray by Nasal route as needed for Congestion  Dispense: 1 each; Refill: 3    2.  Acute tonsillitis, unspecified etiology  -All symptoms resolved, mouth exam is all normal  -Continue Augmentin. Patient to schedule next visit for Pap smear. Requested Prescriptions     Signed Prescriptions Disp Refills    loratadine (CLARITIN) 10 MG tablet 7 tablet 0     Sig: Take 1 tablet by mouth daily    sodium chloride (ALTAMIST SPRAY) 0.65 % nasal spray 1 each 3     Si spray by Nasal route as needed for Congestion       There are no discontinued medications. Gila received counseling on the following healthy behaviors: nutrition, exercise and medication adherence    Discussed use,benefit, and side effects of prescribed medications. Barriers to medication compliance addressed. All patient questions answered. Pt voiced understanding. Return for Schedule for pap amear. Disclaimer: Some orall of this note was transcribed using voice-recognition software. This may cause typographical errors occasionally. Although all effort is made to fix these errors, please do not hesitate to contact our office if there Kendall Lawrence concern with the understanding of this note.

## 2023-06-19 ENCOUNTER — OFFICE VISIT (OUTPATIENT)
Dept: INTERNAL MEDICINE CLINIC | Age: 33
End: 2023-06-19
Payer: MEDICAID

## 2023-06-19 VITALS
BODY MASS INDEX: 32.59 KG/M2 | OXYGEN SATURATION: 99 % | SYSTOLIC BLOOD PRESSURE: 126 MMHG | DIASTOLIC BLOOD PRESSURE: 68 MMHG | WEIGHT: 166 LBS | HEIGHT: 60 IN | HEART RATE: 60 BPM

## 2023-06-19 DIAGNOSIS — F32.A DEPRESSION, UNSPECIFIED DEPRESSION TYPE: ICD-10-CM

## 2023-06-19 DIAGNOSIS — Z13.1 DIABETES MELLITUS SCREENING: ICD-10-CM

## 2023-06-19 DIAGNOSIS — Z13.220 LIPID SCREENING: ICD-10-CM

## 2023-06-19 DIAGNOSIS — Z12.4 CERVICAL CANCER SCREENING: ICD-10-CM

## 2023-06-19 DIAGNOSIS — F64.9 GENDER IDENTITY DISORDER, UNSPECIFIED: Primary | ICD-10-CM

## 2023-06-19 PROCEDURE — 99204 OFFICE O/P NEW MOD 45 MIN: CPT | Performed by: INTERNAL MEDICINE

## 2023-06-19 SDOH — ECONOMIC STABILITY: FOOD INSECURITY: WITHIN THE PAST 12 MONTHS, YOU WORRIED THAT YOUR FOOD WOULD RUN OUT BEFORE YOU GOT MONEY TO BUY MORE.: NEVER TRUE

## 2023-06-19 SDOH — ECONOMIC STABILITY: FOOD INSECURITY: WITHIN THE PAST 12 MONTHS, THE FOOD YOU BOUGHT JUST DIDN'T LAST AND YOU DIDN'T HAVE MONEY TO GET MORE.: NEVER TRUE

## 2023-06-19 SDOH — ECONOMIC STABILITY: INCOME INSECURITY: HOW HARD IS IT FOR YOU TO PAY FOR THE VERY BASICS LIKE FOOD, HOUSING, MEDICAL CARE, AND HEATING?: NOT HARD AT ALL

## 2023-06-19 ASSESSMENT — PATIENT HEALTH QUESTIONNAIRE - PHQ9
SUM OF ALL RESPONSES TO PHQ QUESTIONS 1-9: 0
SUM OF ALL RESPONSES TO PHQ QUESTIONS 1-9: 0
10. IF YOU CHECKED OFF ANY PROBLEMS, HOW DIFFICULT HAVE THESE PROBLEMS MADE IT FOR YOU TO DO YOUR WORK, TAKE CARE OF THINGS AT HOME, OR GET ALONG WITH OTHER PEOPLE: 0
3. TROUBLE FALLING OR STAYING ASLEEP: 0
7. TROUBLE CONCENTRATING ON THINGS, SUCH AS READING THE NEWSPAPER OR WATCHING TELEVISION: 0
8. MOVING OR SPEAKING SO SLOWLY THAT OTHER PEOPLE COULD HAVE NOTICED. OR THE OPPOSITE, BEING SO FIGETY OR RESTLESS THAT YOU HAVE BEEN MOVING AROUND A LOT MORE THAN USUAL: 0
9. THOUGHTS THAT YOU WOULD BE BETTER OFF DEAD, OR OF HURTING YOURSELF: 0
2. FEELING DOWN, DEPRESSED OR HOPELESS: 0
SUM OF ALL RESPONSES TO PHQ QUESTIONS 1-9: 0
5. POOR APPETITE OR OVEREATING: 0
4. FEELING TIRED OR HAVING LITTLE ENERGY: 0
SUM OF ALL RESPONSES TO PHQ QUESTIONS 1-9: 0
1. LITTLE INTEREST OR PLEASURE IN DOING THINGS: 0
6. FEELING BAD ABOUT YOURSELF - OR THAT YOU ARE A FAILURE OR HAVE LET YOURSELF OR YOUR FAMILY DOWN: 0
SUM OF ALL RESPONSES TO PHQ9 QUESTIONS 1 & 2: 0
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
## 2023-07-29 LAB
ALBUMIN SERPL-MCNC: 4.3 G/DL
ALP BLD-CCNC: 70 U/L
ALT SERPL-CCNC: 17 U/L
ANION GAP SERPL CALCULATED.3IONS-SCNC: 8 MMOL/L
AST SERPL-CCNC: 20 U/L
BASOPHILS ABSOLUTE: NORMAL
BASOPHILS RELATIVE PERCENT: NORMAL
BILIRUB SERPL-MCNC: 0.4 MG/DL (ref 0.1–1.4)
BUN BLDV-MCNC: 12 MG/DL
CALCIUM SERPL-MCNC: 8.7 MG/DL
CHLORIDE BLD-SCNC: 109 MMOL/L
CHOLESTEROL, FASTING: 240
CO2: 25 MMOL/L
CREAT SERPL-MCNC: 0.74 MG/DL
EGFR: >90
EOSINOPHILS ABSOLUTE: NORMAL
EOSINOPHILS RELATIVE PERCENT: NORMAL
GLUCOSE BLD-MCNC: 90 MG/DL
HCT VFR BLD CALC: 44 % (ref 36–46)
HDLC SERPL-MCNC: 41 MG/DL (ref 35–70)
HEMOGLOBIN: 14.7 G/DL (ref 12–16)
LDL CHOLESTEROL CALCULATED: 27 MG/DL (ref 0–160)
LYMPHOCYTES ABSOLUTE: NORMAL
LYMPHOCYTES RELATIVE PERCENT: NORMAL
MCH RBC QN AUTO: 30 PG
MCHC RBC AUTO-ENTMCNC: 33.3 G/DL
MCV RBC AUTO: 90 FL
MONOCYTES ABSOLUTE: NORMAL
MONOCYTES RELATIVE PERCENT: NORMAL
NEUTROPHILS ABSOLUTE: NORMAL
NEUTROPHILS RELATIVE PERCENT: NORMAL
PDW BLD-RTO: 13.7 %
PLATELET # BLD: 355 K/ΜL
PMV BLD AUTO: 8.7 FL
POTASSIUM SERPL-SCNC: 4.2 MMOL/L
RBC # BLD: 4.88 10^6/ΜL
SODIUM BLD-SCNC: 142 MMOL/L
TOTAL PROTEIN: 6.7
TRIGLYCERIDE, FASTING: 136
TSH SERPL DL<=0.05 MIU/L-ACNC: 1.55 UIU/ML
WBC # BLD: 9.5 10^3/ML

## 2023-07-31 ENCOUNTER — OFFICE VISIT (OUTPATIENT)
Dept: INTERNAL MEDICINE CLINIC | Age: 33
End: 2023-07-31
Payer: MEDICAID

## 2023-07-31 VITALS
OXYGEN SATURATION: 98 % | WEIGHT: 171.6 LBS | HEART RATE: 62 BPM | SYSTOLIC BLOOD PRESSURE: 122 MMHG | DIASTOLIC BLOOD PRESSURE: 80 MMHG | BODY MASS INDEX: 33.51 KG/M2

## 2023-07-31 DIAGNOSIS — E78.2 MIXED HYPERLIPIDEMIA: Primary | ICD-10-CM

## 2023-07-31 DIAGNOSIS — F31.9 BIPOLAR AFFECTIVE DISORDER, REMISSION STATUS UNSPECIFIED (HCC): ICD-10-CM

## 2023-07-31 PROCEDURE — 99213 OFFICE O/P EST LOW 20 MIN: CPT | Performed by: INTERNAL MEDICINE

## 2023-07-31 RX ORDER — ATORVASTATIN CALCIUM 20 MG/1
20 TABLET, FILM COATED ORAL DAILY
Qty: 30 TABLET | Refills: 3 | Status: SHIPPED | OUTPATIENT
Start: 2023-07-31

## 2023-07-31 NOTE — PROGRESS NOTES
Visit Information    Have you changed or started any medications since your last visit including any over-the-counter medicines, vitamins, or herbal medicines? no   Are you having any side effects from any of your medications? -  no  Have you stopped taking any of your medications? Is so, why? -  no    Have you seen any other physician or provider since your last visit? No  Have you had any other diagnostic tests since your last visit? No  Have you been seen in the emergency room and/or had an admission to a hospital since we last saw you? No  Have you had your routine dental cleaning in the past 6 months? no    Have you activated your [employer] account? If not, what are your barriers?  No:      Patient Care Team:  González Ibarra MD as PCP - General (Internal Medicine)  González Ibarra MD as PCP - Empaneled Provider    Medical History Review  Past Medical, Family, and Social History reviewed and does not contribute to the patient presenting condition    Health Maintenance   Topic Date Due    Varicella vaccine (1 of 2 - 2-dose childhood series) Never done    Pneumococcal 0-64 years Vaccine (1 - PCV) Never done    DTaP/Tdap/Td vaccine (5 - Tdap) 11/22/2002    Cervical cancer screen  11/22/2021    COVID-19 Vaccine (3 - Booster for Pfizer series) 01/24/2022    Flu vaccine (1) 08/01/2023    Depression Monitoring  06/19/2024    Hib vaccine  Completed    Hepatitis C screen  Completed    Hepatitis A vaccine  Aged Out    Meningococcal (ACWY) vaccine  Aged Out    Hepatitis B vaccine  Discontinued    HIV screen  Discontinued     SUBJECTIVE:  Josef Bates is a 33 y.o. female patient who  comes for complaints of   Chief Complaint   Patient presents with    Bipolar     4 week follow up          Lab results  Hyperlipidemia- pt reports cardiology advised meds about 6yr ago    Results in care-everywhere  Discussed in detail  10yr CVD cannot be calculated at her age  Pt is a smoker  Trying to cut down  Family hx not

## 2023-08-03 DIAGNOSIS — Z13.1 DIABETES MELLITUS SCREENING: ICD-10-CM

## 2023-08-03 DIAGNOSIS — J02.9 SORE THROAT: ICD-10-CM

## 2023-08-03 DIAGNOSIS — Z13.220 LIPID SCREENING: ICD-10-CM

## 2023-08-03 DIAGNOSIS — F32.A DEPRESSION, UNSPECIFIED DEPRESSION TYPE: ICD-10-CM
